# Patient Record
Sex: MALE | Race: WHITE | Employment: FULL TIME | URBAN - METROPOLITAN AREA
[De-identification: names, ages, dates, MRNs, and addresses within clinical notes are randomized per-mention and may not be internally consistent; named-entity substitution may affect disease eponyms.]

---

## 2018-11-13 PROBLEM — R29.898 WEAKNESS OF BOTH LOWER EXTREMITIES: Status: ACTIVE | Noted: 2018-11-13

## 2018-11-13 PROBLEM — E66.01 OBESITY, MORBID (HCC): Status: ACTIVE | Noted: 2018-11-13

## 2018-12-04 ENCOUNTER — ANESTHESIA EVENT (OUTPATIENT)
Dept: SURGERY | Age: 48
End: 2018-12-04

## 2018-12-04 RX ORDER — HYDROMORPHONE HYDROCHLORIDE 2 MG/ML
0.5 INJECTION, SOLUTION INTRAMUSCULAR; INTRAVENOUS; SUBCUTANEOUS
Status: CANCELLED | OUTPATIENT
Start: 2018-12-04

## 2018-12-04 RX ORDER — HYDROCODONE BITARTRATE AND ACETAMINOPHEN 5; 325 MG/1; MG/1
2 TABLET ORAL AS NEEDED
Status: CANCELLED | OUTPATIENT
Start: 2018-12-04

## 2018-12-04 RX ORDER — SODIUM CHLORIDE, SODIUM LACTATE, POTASSIUM CHLORIDE, CALCIUM CHLORIDE 600; 310; 30; 20 MG/100ML; MG/100ML; MG/100ML; MG/100ML
75 INJECTION, SOLUTION INTRAVENOUS CONTINUOUS
Status: CANCELLED | OUTPATIENT
Start: 2018-12-04

## 2018-12-04 RX ORDER — OXYCODONE HYDROCHLORIDE 5 MG/1
5 TABLET ORAL
Status: CANCELLED | OUTPATIENT
Start: 2018-12-04 | End: 2018-12-05

## 2018-12-05 ENCOUNTER — HOSPITAL ENCOUNTER (OUTPATIENT)
Dept: MRI IMAGING | Age: 48
Discharge: HOME | End: 2018-12-05
Attending: NEUROLOGICAL SURGERY

## 2018-12-05 ENCOUNTER — HOSPITAL ENCOUNTER (OUTPATIENT)
Age: 48
Setting detail: OUTPATIENT SURGERY
Discharge: HOME | End: 2018-12-05
Attending: RADIOLOGY | Admitting: RADIOLOGY

## 2018-12-05 ENCOUNTER — ANESTHESIA (OUTPATIENT)
Dept: SURGERY | Age: 48
End: 2018-12-05

## 2018-12-05 VITALS
HEART RATE: 110 BPM | OXYGEN SATURATION: 97 % | TEMPERATURE: 98.7 F | WEIGHT: 315 LBS | DIASTOLIC BLOOD PRESSURE: 111 MMHG | BODY MASS INDEX: 44.1 KG/M2 | SYSTOLIC BLOOD PRESSURE: 197 MMHG | RESPIRATION RATE: 20 BRPM | HEIGHT: 71 IN

## 2018-12-05 DIAGNOSIS — R29.898 LEG WEAKNESS: ICD-10-CM

## 2018-12-05 RX ORDER — LIDOCAINE HYDROCHLORIDE 10 MG/ML
0.1 INJECTION INFILTRATION; PERINEURAL AS NEEDED
Status: DISCONTINUED | OUTPATIENT
Start: 2018-12-05 | End: 2018-12-07 | Stop reason: HOSPADM

## 2018-12-05 RX ORDER — SODIUM CHLORIDE, SODIUM LACTATE, POTASSIUM CHLORIDE, CALCIUM CHLORIDE 600; 310; 30; 20 MG/100ML; MG/100ML; MG/100ML; MG/100ML
75 INJECTION, SOLUTION INTRAVENOUS CONTINUOUS
Status: ACTIVE | OUTPATIENT
Start: 2018-12-05 | End: 2018-12-06

## 2018-12-05 RX ORDER — MIDAZOLAM HYDROCHLORIDE 1 MG/ML
2 INJECTION, SOLUTION INTRAMUSCULAR; INTRAVENOUS
Status: ACTIVE | OUTPATIENT
Start: 2018-12-05 | End: 2018-12-06

## 2018-12-05 RX ORDER — MIDAZOLAM HYDROCHLORIDE 1 MG/ML
5 INJECTION, SOLUTION INTRAMUSCULAR; INTRAVENOUS ONCE
Status: ACTIVE | OUTPATIENT
Start: 2018-12-05 | End: 2018-12-06

## 2018-12-05 RX ORDER — FENTANYL CITRATE 50 UG/ML
100 INJECTION, SOLUTION INTRAMUSCULAR; INTRAVENOUS ONCE
Status: ACTIVE | OUTPATIENT
Start: 2018-12-05 | End: 2018-12-06

## 2018-12-05 NOTE — ANESTHESIA PREPROCEDURE EVALUATION
Anesthetic History No history of anesthetic complications Review of Systems / Medical History Patient summary reviewed and pertinent labs reviewed Pulmonary Within defined limits Neuro/Psych Within defined limits Cardiovascular Hypertension (no meds): poorly controlled Exercise tolerance: >4 METS 
  
GI/Hepatic/Renal 
Within defined limits Endo/Other Hypothyroidism: well controlled Morbid obesity Other Findings Physical Exam 
 
Airway Mallampati: III 
TM Distance: 4 - 6 cm Neck ROM: normal range of motion Mouth opening: Normal 
 
 Cardiovascular Regular rate and rhythm,  S1 and S2 normal,  no murmur, click, rub, or gallop Dental 
 
Dentition: Upper partial plate and Lower partial plate Pulmonary Breath sounds clear to auscultation Abdominal 
GI exam deferred Other Findings Anesthetic Plan ASA: 3 Anesthesia type: general 
 
 
 
 
Induction: Intravenous Anesthetic plan and risks discussed with: Patient

## 2018-12-05 NOTE — H&P
Patient: Cali Hand MRN: [id number]  SSN: [id number]    YOB: 1970  Age: 46 y.o. Sex: male      History and Physical    Yohannes Marcus is a 46 y.o. male having Procedure(s):  MRI ANESTHESIA- THORACIC SPINE WITHOUT CONTRAST. Allergies: No Known Allergies      Chief Complaint: Leg weakness         History of Present Illness: Leg weakness       Past Medical History:   Diagnosis Date    Arthritis     osteo    Claustrophobia     Hypertension     off meds x 3 yrs--pt stopped on his own--per pt-- b/p stable off meds    Leg weakness, bilateral onset 2/2017    Morbid obesity ([address]     bmi  50    Unspecified hypothyroidism       Past Surgical History:   Procedure Laterality Date    HX KNEE ARTHROSCOPY      bilateral    HX LUMBAR DISKECTOMY  2001    L-5    HX ORTHOPAEDIC      bone spur removal left ankle      Family History   Problem Relation Age of Onset    Thyroid Disease Mother     Hypertension Mother     Diabetes Maternal Grandfather     Cancer Maternal Grandfather         lung    Heart Disease Maternal Grandfather       Social History     Tobacco Use    Smoking status: Never Smoker    Smokeless tobacco: Former User   Substance Use Topics    Alcohol use: Yes     Comment: occ        Prior to Admission medications    Medication Sig Start Date End Date Taking? Authorizing Provider   Omega-3 Fatty Acids (FISH OIL) 500 mg cap Take 1 Tab by mouth daily. Yes Provider, Historical   levothyroxine (SYNTHROID) 112 mcg tablet Take 1 Tab by mouth Daily (before breakfast). 9/19/18  Yes Grecia Barreto MD        Visit Vitals  BP (!) 197/111 (BP Patient Position: At rest)   Pulse (!) 110   Temp 37.1 °C (98.7 °F)   Resp 20   Ht 5' 11\" (1.803 m)   Wt (!) 164.9 kg (363 lb 8 oz)   SpO2 97%   BMI 50.70 kg/m²        Assessment and Plan:   Cali Hand is a 46 y.o. male having Procedure(s):   MRI ANESTHESIA- THORACIC SPINE WITHOUT CONTRAST for Leg weakness   .     Preanesthesia Evaluation Last edited 12/05/18 1306 by Ofe Arzola MD  Date of Service 12/05/18 1248             Anesthetic History   No history of anesthetic complications            Review of Systems / Medical History  Patient summary reviewed and pertinent labs reviewed    Pulmonary  Within defined limits                 Neuro/Psych   Within defined limits           Cardiovascular    Hypertension (no meds): poorly controlled              Exercise tolerance: >4 METS     GI/Hepatic/Renal  Within defined limits              Endo/Other      Hypothyroidism: well controlled  Morbid obesity     Other Findings            Physical Exam    Airway  Mallampati: III  TM Distance: 4 - 6 cm  Neck ROM: normal range of motion   Mouth opening: Normal     Cardiovascular  Regular rate and rhythm,  S1 and S2 normal,  no murmur, click, rub, or gallop             Dental    Dentition: Upper partial plate and Lower partial plate     Pulmonary  Breath sounds clear to auscultation               Abdominal  GI exam deferred       Other Findings            Anesthetic Plan    ASA: 3  Anesthesia type: general          Induction: Intravenous  Anesthetic plan and risks discussed with: Patient               Preanesthesia evaluation performed by Ofe Arzola MD    Revision History       Date/Time User Provider Type Action    > 12/05/18 1306 Marck Lee MD Anesthesiologist Addend     12/05/18 1249 Marck Lee MD Anesthesiologist Addend     12/05/18 1248 Marck Lee MD Anesthesiologist Sign                  Signed By: Karthikeyan Asif MD     December 5, 2018

## 2018-12-05 NOTE — PROGRESS NOTES
Due to patient body habitus we had to check to see if patient would fit into machine for exam. It does not appear with his shoulder width that he will fit into the wide bore for this exam. Since he was to be sedated we wanted to check first but the patient would not allow us to try without the sedation but anesthesia did not feel comfortable sedating him before seeing if he would forget. He refused versed prior to the attempt also . Patient wants to go to an open air MRI.

## 2022-03-18 PROBLEM — E66.01 OBESITY, MORBID (HCC): Status: ACTIVE | Noted: 2018-11-13

## 2022-03-19 PROBLEM — R29.898 WEAKNESS OF BOTH LOWER EXTREMITIES: Status: ACTIVE | Noted: 2018-11-13

## 2023-08-01 ENCOUNTER — TELEPHONE (OUTPATIENT)
Dept: ORTHOPEDIC SURGERY | Age: 53
End: 2023-08-01

## 2023-08-04 ENCOUNTER — TELEPHONE (OUTPATIENT)
Dept: FAMILY MEDICINE CLINIC | Facility: CLINIC | Age: 53
End: 2023-08-04

## 2023-08-04 DIAGNOSIS — R73.09 OTHER ABNORMAL GLUCOSE: ICD-10-CM

## 2023-08-04 DIAGNOSIS — I10 ESSENTIAL (PRIMARY) HYPERTENSION: Primary | ICD-10-CM

## 2023-08-04 DIAGNOSIS — R53.83 FATIGUE, UNSPECIFIED TYPE: ICD-10-CM

## 2023-08-04 DIAGNOSIS — Z12.5 PROSTATE CANCER SCREENING: ICD-10-CM

## 2023-08-04 NOTE — TELEPHONE ENCOUNTER
----- Message from [address] [place] [address] sent at 8/4/2023 11:05 AM EDT -----  Subject: Referral Request    Reason for referral request? A1C, insulin, lipid panel, testosterone   Provider patient wants to be referred to(if known):     Provider Phone Number(if known): Additional Information for Provider? Pt wants these down before his appt   in October. Please call pt to make appt when these are scheduled.  Please   call pt about where blood gets sent for billing purposes  ---------------------------------------------------------------------------  --------------  Lj Ashland Christine    [phone]; OK to leave message on voicemail  ---------------------------------------------------------------------------  --------------

## 2023-08-07 NOTE — TELEPHONE ENCOUNTER
Pt to call back next month to schedule and informed pt we send labs to the hospital now and not lab [facility]

## 2023-08-28 ENCOUNTER — OFFICE VISIT (OUTPATIENT)
Dept: ORTHOPEDIC SURGERY | Age: 53
End: 2023-08-28
Payer: COMMERCIAL

## 2023-08-28 VITALS — BODY MASS INDEX: 43.67 KG/M2 | HEIGHT: 70 IN | WEIGHT: 305 LBS

## 2023-08-28 DIAGNOSIS — E66.01 MORBID OBESITY (HCC): ICD-10-CM

## 2023-08-28 DIAGNOSIS — G89.29 CHRONIC BILATERAL LOW BACK PAIN, UNSPECIFIED WHETHER SCIATICA PRESENT: ICD-10-CM

## 2023-08-28 DIAGNOSIS — M54.50 CHRONIC BILATERAL LOW BACK PAIN, UNSPECIFIED WHETHER SCIATICA PRESENT: ICD-10-CM

## 2023-08-28 DIAGNOSIS — M17.12 PRIMARY OSTEOARTHRITIS OF LEFT KNEE: ICD-10-CM

## 2023-08-28 DIAGNOSIS — M25.562 ACUTE PAIN OF LEFT KNEE: Primary | ICD-10-CM

## 2023-08-28 DIAGNOSIS — M17.11 PRIMARY OSTEOARTHRITIS OF RIGHT KNEE: ICD-10-CM

## 2023-08-28 PROCEDURE — G8428 CUR MEDS NOT DOCUMENT: HCPCS | Performed by: ORTHOPAEDIC SURGERY

## 2023-08-28 PROCEDURE — 99205 OFFICE O/P NEW HI 60 MIN: CPT | Performed by: ORTHOPAEDIC SURGERY

## 2023-08-28 PROCEDURE — 3017F COLORECTAL CA SCREEN DOC REV: CPT | Performed by: ORTHOPAEDIC SURGERY

## 2023-08-28 PROCEDURE — 4004F PT TOBACCO SCREEN RCVD TLK: CPT | Performed by: ORTHOPAEDIC SURGERY

## 2023-08-28 PROCEDURE — G8417 CALC BMI ABV UP PARAM F/U: HCPCS | Performed by: ORTHOPAEDIC SURGERY

## 2023-08-28 NOTE — PROGRESS NOTES
Name: Yair Perez  YOB: 1970  Gender: male  MRN: [id number]    CC: Left knee pain    HPI: Yair Perez is a 52 y.o. male who presents with longstanding progressive left knee pain and weakness. He states he had left knee arthroscopy in the mid 90s and found that his left knee has become more more unstable and insecure to him. He has had significant other issues including with his lower back and had 2 lumbar spine surgeries. Most recent 1 was in 2021 for what sounds like spinal stenosis. He had weakness in the lower extremities that was attributable to that. He feels that surgery is going well but he continues to have pain and instability with his left knee and a sense of insecurity on that side. This is much more so than on the right. He also has a history of morbid obesity with a BMI at 1 point over 50. He has lost a significant amount of weight although he still has a BMI of approaching 44. 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 ROS/Meds/PSH/PMH/FH/SH: I personally reviewed the patients standard intake form. Below are the pertinents    Tobacco:  reports that he has never smoked. He quit smokeless tobacco use about 28 years ago. Past Medical History:   Diagnosis Date    Arthritis     osteo    Claustrophobia     Hypertension     off meds x 3 yrs--pt stopped on his own--per pt-- b/p stable off meds    Leg weakness, bilateral onset 2/2017    Morbid obesity ([address])     bmi  50    Unspecified hypothyroidism       Past Surgical History:   Procedure Laterality Date    KNEE ARTHROSCOPY      bilateral    LUMBAR DISCECTOMY  2001    L-5    ORTHOPEDIC SURGERY      bone spur removal left ankle        Physical Examination:  Physical exam: On examination of the patient's gait there is antalgia in stance on the left side.   and there is valgus deformity in the leftknee    On examination while

## 2023-09-19 DIAGNOSIS — M17.12 PRIMARY OSTEOARTHRITIS OF LEFT KNEE: Primary | ICD-10-CM

## 2023-09-29 ENCOUNTER — NURSE ONLY (OUTPATIENT)
Dept: FAMILY MEDICINE CLINIC | Facility: CLINIC | Age: 53
End: 2023-09-29

## 2023-09-29 DIAGNOSIS — R53.83 FATIGUE, UNSPECIFIED TYPE: ICD-10-CM

## 2023-09-29 DIAGNOSIS — I10 ESSENTIAL (PRIMARY) HYPERTENSION: ICD-10-CM

## 2023-09-29 DIAGNOSIS — Z12.5 PROSTATE CANCER SCREENING: ICD-10-CM

## 2023-09-29 DIAGNOSIS — R73.09 OTHER ABNORMAL GLUCOSE: ICD-10-CM

## 2023-09-29 LAB
ALBUMIN SERPL-MCNC: 3.6 G/DL (ref 3.5–5)
ALBUMIN/GLOB SERPL: 1 (ref 0.4–1.6)
ALP SERPL-CCNC: 61 U/L (ref 50–136)
ALT SERPL-CCNC: 23 U/L (ref 12–65)
ANION GAP SERPL CALC-SCNC: 4 MMOL/L (ref 2–11)
AST SERPL-CCNC: 15 U/L (ref 15–37)
BILIRUB SERPL-MCNC: 0.6 MG/DL (ref 0.2–1.1)
BUN SERPL-MCNC: 13 MG/DL (ref 6–23)
CALCIUM SERPL-MCNC: 9 MG/DL (ref 8.3–10.4)
CHLORIDE SERPL-SCNC: 108 MMOL/L (ref 101–110)
CHOLEST SERPL-MCNC: 164 MG/DL
CO2 SERPL-SCNC: 30 MMOL/L (ref 21–32)
CREAT SERPL-MCNC: 1 MG/DL (ref 0.8–1.5)
EST. AVERAGE GLUCOSE BLD GHB EST-MCNC: 114 MG/DL
GLOBULIN SER CALC-MCNC: 3.5 G/DL (ref 2.8–4.5)
GLUCOSE SERPL-MCNC: 107 MG/DL (ref 65–100)
HBA1C MFR BLD: 5.6 % (ref 4.8–5.6)
HDLC SERPL-MCNC: 33 MG/DL (ref 40–60)
HDLC SERPL: 5
LDLC SERPL CALC-MCNC: 103.8 MG/DL
POTASSIUM SERPL-SCNC: 4.3 MMOL/L (ref 3.5–5.1)
PROT SERPL-MCNC: 7.1 G/DL (ref 6.3–8.2)
PSA SERPL-MCNC: 0.5 NG/ML
SODIUM SERPL-SCNC: 142 MMOL/L (ref 133–143)
TRIGL SERPL-MCNC: 136 MG/DL (ref 35–150)
VLDLC SERPL CALC-MCNC: 27.2 MG/DL (ref 6–23)

## 2023-09-30 LAB — INSULIN SERPL-ACNC: 21.4 UIU/ML (ref 2.6–24.9)

## 2023-10-06 ENCOUNTER — TELEPHONE (OUTPATIENT)
Dept: FAMILY MEDICINE CLINIC | Facility: CLINIC | Age: 53
End: 2023-10-06

## 2023-10-06 LAB — TESTOST FREE SERPL-MCNC: 5.7 PG/ML (ref 7.2–24)

## 2023-10-06 NOTE — TELEPHONE ENCOUNTER
See if it is too late to add the total testosterone to the labs drawn and if so I will get the total testosterone drawn when he comes in next week

## 2023-10-06 NOTE — TELEPHONE ENCOUNTER
Patient called stating he received the results on mychart for the [facility] Free lab patient states he thought he was having both Testo Free and Testo Total lab, spoke with Dr Delano Stone he's going check on this and see if he could add this if its not to late, patient have a OV on 10/10 @ 6756 with Dr Delano Stone

## 2023-10-07 SDOH — ECONOMIC STABILITY: TRANSPORTATION INSECURITY
IN THE PAST 12 MONTHS, HAS LACK OF TRANSPORTATION KEPT YOU FROM MEETINGS, WORK, OR FROM GETTING THINGS NEEDED FOR DAILY LIVING?: NO

## 2023-10-07 SDOH — ECONOMIC STABILITY: FOOD INSECURITY: WITHIN THE PAST 12 MONTHS, THE FOOD YOU BOUGHT JUST DIDN'T LAST AND YOU DIDN'T HAVE MONEY TO GET MORE.: NEVER TRUE

## 2023-10-07 SDOH — ECONOMIC STABILITY: INCOME INSECURITY: HOW HARD IS IT FOR YOU TO PAY FOR THE VERY BASICS LIKE FOOD, HOUSING, MEDICAL CARE, AND HEATING?: NOT HARD AT ALL

## 2023-10-07 SDOH — ECONOMIC STABILITY: FOOD INSECURITY: WITHIN THE PAST 12 MONTHS, YOU WORRIED THAT YOUR FOOD WOULD RUN OUT BEFORE YOU GOT MONEY TO BUY MORE.: NEVER TRUE

## 2023-10-07 SDOH — ECONOMIC STABILITY: HOUSING INSECURITY
IN THE LAST 12 MONTHS, WAS THERE A TIME WHEN YOU DID NOT HAVE A STEADY PLACE TO SLEEP OR SLEPT IN A SHELTER (INCLUDING NOW)?: NO

## 2023-10-07 ASSESSMENT — PATIENT HEALTH QUESTIONNAIRE - PHQ9
SUM OF ALL RESPONSES TO PHQ QUESTIONS 1-9: 0
SUM OF ALL RESPONSES TO PHQ QUESTIONS 1-9: 0
1. LITTLE INTEREST OR PLEASURE IN DOING THINGS: 0
1. LITTLE INTEREST OR PLEASURE IN DOING THINGS: NOT AT ALL
SUM OF ALL RESPONSES TO PHQ QUESTIONS 1-9: 0
2. FEELING DOWN, DEPRESSED OR HOPELESS: 0
SUM OF ALL RESPONSES TO PHQ QUESTIONS 1-9: 0
2. FEELING DOWN, DEPRESSED OR HOPELESS: NOT AT ALL
SUM OF ALL RESPONSES TO PHQ9 QUESTIONS 1 & 2: 0
SUM OF ALL RESPONSES TO PHQ9 QUESTIONS 1 & 2: 0

## 2023-10-10 ENCOUNTER — OFFICE VISIT (OUTPATIENT)
Dept: FAMILY MEDICINE CLINIC | Facility: CLINIC | Age: 53
End: 2023-10-10
Payer: COMMERCIAL

## 2023-10-10 VITALS
OXYGEN SATURATION: 97 % | DIASTOLIC BLOOD PRESSURE: 109 MMHG | TEMPERATURE: 98 F | SYSTOLIC BLOOD PRESSURE: 181 MMHG | BODY MASS INDEX: 44.24 KG/M2 | RESPIRATION RATE: 16 BRPM | WEIGHT: 309 LBS | HEIGHT: 70 IN | HEART RATE: 81 BPM

## 2023-10-10 DIAGNOSIS — R63.5 WEIGHT GAIN: ICD-10-CM

## 2023-10-10 DIAGNOSIS — R73.09 OTHER ABNORMAL GLUCOSE: ICD-10-CM

## 2023-10-10 DIAGNOSIS — R79.89 LOW TESTOSTERONE: ICD-10-CM

## 2023-10-10 DIAGNOSIS — I10 ESSENTIAL (PRIMARY) HYPERTENSION: Primary | ICD-10-CM

## 2023-10-10 DIAGNOSIS — D23.4 DERMOID CYST OF SCALP: ICD-10-CM

## 2023-10-10 PROCEDURE — 1036F TOBACCO NON-USER: CPT | Performed by: FAMILY MEDICINE

## 2023-10-10 PROCEDURE — 3080F DIAST BP >= 90 MM HG: CPT | Performed by: FAMILY MEDICINE

## 2023-10-10 PROCEDURE — G8417 CALC BMI ABV UP PARAM F/U: HCPCS | Performed by: FAMILY MEDICINE

## 2023-10-10 PROCEDURE — G8484 FLU IMMUNIZE NO ADMIN: HCPCS | Performed by: FAMILY MEDICINE

## 2023-10-10 PROCEDURE — G8427 DOCREV CUR MEDS BY ELIG CLIN: HCPCS | Performed by: FAMILY MEDICINE

## 2023-10-10 PROCEDURE — 3077F SYST BP >= 140 MM HG: CPT | Performed by: FAMILY MEDICINE

## 2023-10-10 PROCEDURE — 3017F COLORECTAL CA SCREEN DOC REV: CPT | Performed by: FAMILY MEDICINE

## 2023-10-10 PROCEDURE — 99214 OFFICE O/P EST MOD 30 MIN: CPT | Performed by: FAMILY MEDICINE

## 2023-10-10 RX ORDER — LISINOPRIL 40 MG/1
40 TABLET ORAL DAILY
Qty: 90 TABLET | Refills: 1 | Status: SHIPPED | OUTPATIENT
Start: 2023-10-10

## 2023-10-10 NOTE — PROGRESS NOTES
abnormal glucose  -     Hemoglobin A1C; Future  -     Insulin, Serum; Future    Low testosterone  -     Testosterone, Free; Future  -     Testosterone Total Only, Male; Future    Weight gain  -     TSH; Future    Dermoid cyst of scalp  -     [facility] - Jay Jay Hu MD, PA    We will refer to dermatology and consider repeat labs in 6 months and let him continue to watch diet and may repeat testosterone at that time which hopefully can improve if he gets through the knee surgery and is to exercise more. We will restart the lisinopril particularly the surgery coming up    Follow-up and Dispositions    Return in about 6 months (around 4/10/2024), or if symptoms worsen or fail to improve. Dictated using voice recognition software.  Proofread, but unrecognized errors may exist.

## 2023-10-31 ENCOUNTER — HOSPITAL ENCOUNTER (OUTPATIENT)
Dept: REHABILITATION | Age: 53
Discharge: HOME | End: 2023-11-03
Payer: COMMERCIAL

## 2023-10-31 ENCOUNTER — HOSPITAL ENCOUNTER (OUTPATIENT)
Dept: SURGERY | Age: 53
Discharge: HOME | End: 2023-11-03
Payer: COMMERCIAL

## 2023-10-31 VITALS
HEIGHT: 70 IN | BODY MASS INDEX: 45.07 KG/M2 | HEART RATE: 79 BPM | WEIGHT: 314.82 LBS | RESPIRATION RATE: 18 BRPM | DIASTOLIC BLOOD PRESSURE: 95 MMHG | OXYGEN SATURATION: 97 % | TEMPERATURE: 98.2 F | SYSTOLIC BLOOD PRESSURE: 166 MMHG

## 2023-10-31 DIAGNOSIS — M17.12 PRIMARY OSTEOARTHRITIS OF LEFT KNEE: ICD-10-CM

## 2023-10-31 DIAGNOSIS — R06.83 SNORING: Primary | ICD-10-CM

## 2023-10-31 LAB
ANION GAP SERPL CALC-SCNC: 7 MMOL/L (ref 2–11)
BASOPHILS # BLD: 0.1 K/UL (ref 0–0.2)
BASOPHILS NFR BLD: 1 % (ref 0–2)
BUN SERPL-MCNC: 19 MG/DL (ref 6–23)
CALCIUM SERPL-MCNC: 8.7 MG/DL (ref 8.3–10.4)
CHLORIDE SERPL-SCNC: 108 MMOL/L (ref 101–110)
CO2 SERPL-SCNC: 26 MMOL/L (ref 21–32)
CREAT SERPL-MCNC: 0.83 MG/DL (ref 0.8–1.5)
DIFFERENTIAL METHOD BLD: NORMAL
EKG ATRIAL RATE: 76 BPM
EKG DIAGNOSIS: NORMAL
EKG P AXIS: 48 DEGREES
EKG P-R INTERVAL: 156 MS
EKG Q-T INTERVAL: 384 MS
EKG QRS DURATION: 96 MS
EKG QTC CALCULATION (BAZETT): 432 MS
EKG R AXIS: 13 DEGREES
EKG T AXIS: 22 DEGREES
EKG VENTRICULAR RATE: 76 BPM
EOSINOPHIL # BLD: 0.3 K/UL (ref 0–0.8)
EOSINOPHIL NFR BLD: 4 % (ref 0.5–7.8)
ERYTHROCYTE [DISTWIDTH] IN BLOOD BY AUTOMATED COUNT: 12.5 % (ref 11.9–14.6)
EST. AVERAGE GLUCOSE BLD GHB EST-MCNC: 108 MG/DL
GLUCOSE SERPL-MCNC: 123 MG/DL (ref 65–100)
HBA1C MFR BLD: 5.4 % (ref 4.8–5.6)
HCT VFR BLD AUTO: 42.9 % (ref 41.1–50.3)
HGB BLD-MCNC: 14.2 G/DL (ref 13.6–17.2)
IMM GRANULOCYTES # BLD AUTO: 0 K/UL (ref 0–0.5)
IMM GRANULOCYTES NFR BLD AUTO: 0 % (ref 0–5)
INR PPP: 1
LYMPHOCYTES # BLD: 1.5 K/UL (ref 0.5–4.6)
LYMPHOCYTES NFR BLD: 24 % (ref 13–44)
MCH RBC QN AUTO: 27.8 PG (ref 26.1–32.9)
MCHC RBC AUTO-ENTMCNC: 33.1 G/DL (ref 31.4–35)
MCV RBC AUTO: 84.1 FL (ref 82–102)
MONOCYTES # BLD: 0.6 K/UL (ref 0.1–1.3)
MONOCYTES NFR BLD: 9 % (ref 4–12)
MRSA DNA SPEC QL NAA+PROBE: NOT DETECTED
NEUTS SEG # BLD: 3.8 K/UL (ref 1.7–8.2)
NEUTS SEG NFR BLD: 61 % (ref 43–78)
NRBC # BLD: 0 K/UL (ref 0–0.2)
PLATELET # BLD AUTO: 209 K/UL (ref 150–450)
PMV BLD AUTO: 11 FL (ref 9.4–12.3)
POTASSIUM SERPL-SCNC: 4 MMOL/L (ref 3.5–5.1)
PROTHROMBIN TIME: 13.4 SEC (ref 12.6–14.3)
RBC # BLD AUTO: 5.1 M/UL (ref 4.23–5.6)
S AUREUS CPE NOSE QL NAA+PROBE: DETECTED
SODIUM SERPL-SCNC: 141 MMOL/L (ref 133–143)
WBC # BLD AUTO: 6.3 K/UL (ref 4.3–11.1)

## 2023-10-31 PROCEDURE — 87641 MR-STAPH DNA AMP PROBE: CPT

## 2023-10-31 PROCEDURE — 94760 N-INVAS EAR/PLS OXIMETRY 1: CPT

## 2023-10-31 PROCEDURE — 80048 BASIC METABOLIC PNL TOTAL CA: CPT

## 2023-10-31 PROCEDURE — 93005 ELECTROCARDIOGRAM TRACING: CPT | Performed by: STUDENT IN AN ORGANIZED HEALTH CARE EDUCATION/TRAINING PROGRAM

## 2023-10-31 PROCEDURE — 97161 PT EVAL LOW COMPLEX 20 MIN: CPT

## 2023-10-31 PROCEDURE — 83036 HEMOGLOBIN GLYCOSYLATED A1C: CPT

## 2023-10-31 PROCEDURE — 93010 ELECTROCARDIOGRAM REPORT: CPT | Performed by: INTERNAL MEDICINE

## 2023-10-31 PROCEDURE — 85025 COMPLETE CBC W/AUTO DIFF WBC: CPT

## 2023-10-31 PROCEDURE — 85610 PROTHROMBIN TIME: CPT

## 2023-10-31 ASSESSMENT — PROMIS GLOBAL HEALTH SCALE
IN GENERAL, HOW WOULD YOU RATE YOUR SATISFACTION WITH YOUR SOCIAL ACTIVITIES AND RELATIONSHIPS [ON A SCALE OF 1 (POOR) TO 5 (EXCELLENT)]?: 4
IN GENERAL, HOW WOULD YOU RATE YOUR MENTAL HEALTH, INCLUDING YOUR MOOD AND YOUR ABILITY TO THINK [ON A SCALE OF 1 (POOR) TO 5 (EXCELLENT)]?: 5
WHO IS THE PERSON COMPLETING THE PROMIS V1.1 SURVEY?: 0
IN GENERAL, HOW WOULD YOU RATE YOUR PHYSICAL HEALTH [ON A SCALE OF 1 (POOR) TO 5 (EXCELLENT)]?: 3
IN GENERAL, WOULD YOU SAY YOUR QUALITY OF LIFE IS...[ON A SCALE OF 1 (POOR) TO 5 (EXCELLENT)]: 2
IN GENERAL, WOULD YOU SAY YOUR HEALTH IS...[ON A SCALE OF 1 (POOR) TO 5 (EXCELLENT)]: 3
IN THE PAST 7 DAYS, HOW OFTEN HAVE YOU BEEN BOTHERED BY EMOTIONAL PROBLEMS, SUCH AS FEELING ANXIOUS, DEPRESSED, OR IRRITABLE [ON A SCALE FROM 1 (NEVER) TO 5 (ALWAYS)]?: 5
SUM OF RESPONSES TO QUESTIONS 3, 6, 7, & 8: 11
TO WHAT EXTENT ARE YOU ABLE TO CARRY OUT YOUR EVERYDAY PHYSICAL ACTIVITIES SUCH AS WALKING, CLIMBING STAIRS, CARRYING GROCERIES, OR MOVING A CHAIR [ON A SCALE OF 1 (NOT AT ALL) TO 5 (COMPLETELY)]?: 2
HOW IS THE PROMIS V1.1 BEING ADMINISTERED?: 0
IN THE PAST 7 DAYS, HOW WOULD YOU RATE YOUR FATIGUE ON AVERAGE [ON A SCALE FROM 1 (NONE) TO 5 (VERY SEVERE)]?: 4
IN THE PAST 7 DAYS, HOW WOULD YOU RATE YOUR PAIN ON AVERAGE [ON A SCALE FROM 0 (NO PAIN) TO 10 (WORST IMAGINABLE PAIN)]?: 2
IN GENERAL, PLEASE RATE HOW WELL YOU CARRY OUT YOUR USUAL SOCIAL ACTIVITIES (INCLUDES ACTIVITIES AT HOME, AT WORK, AND IN YOUR COMMUNITY, AND RESPONSIBILITIES AS A PARENT, CHILD, SPOUSE, EMPLOYEE, FRIEND, ETC) [ON A SCALE OF 1 (POOR) TO 5 (EXCELLENT)]?: 5
SUM OF RESPONSES TO QUESTIONS 2, 4, 5, & 10: 16

## 2023-10-31 ASSESSMENT — PAIN DESCRIPTION - DESCRIPTORS
DESCRIPTORS: STABBING;DULL
DESCRIPTORS: DULL;SHARP

## 2023-10-31 ASSESSMENT — PAIN DESCRIPTION - ORIENTATION
ORIENTATION: LEFT;ANTERIOR;INNER;OUTER;POSTERIOR
ORIENTATION: LEFT;OUTER;INNER

## 2023-10-31 ASSESSMENT — KOOS JR
GOING UP OR DOWN STAIRS: 3
STRAIGHTENING KNEE FULLY: 1
HOW SEVERE IS YOUR KNEE STIFFNESS AFTER FIRST WAKING IN MORNING: 1
TWISING OR PIVOTING ON KNEE: 2
BENDING TO THE FLOOR TO PICK UP OBJECT: 3
KOOS JR TOTAL INTERVAL SCORE: 59.381
STANDING UPRIGHT: 0
RISING FROM SITTING: 1

## 2023-10-31 ASSESSMENT — PAIN DESCRIPTION - FREQUENCY: FREQUENCY: INTERMITTENT

## 2023-10-31 ASSESSMENT — PAIN DESCRIPTION - PAIN TYPE: TYPE: CHRONIC PAIN

## 2023-10-31 ASSESSMENT — PAIN - FUNCTIONAL ASSESSMENT: PAIN_FUNCTIONAL_ASSESSMENT: PREVENTS OR INTERFERES SOME ACTIVE ACTIVITIES AND ADLS

## 2023-10-31 ASSESSMENT — PAIN DESCRIPTION - ONSET: ONSET: ON-GOING

## 2023-10-31 ASSESSMENT — PULMONARY FUNCTION TESTS
FEV1 (%PREDICTED): 95
FEV1 (LITERS): 3.35

## 2023-10-31 ASSESSMENT — PAIN DESCRIPTION - LOCATION
LOCATION: KNEE
LOCATION: KNEE

## 2023-10-31 ASSESSMENT — PAIN SCALES - GENERAL
PAINLEVEL_OUTOF10: 3
PAINLEVEL_OUTOF10: 2

## 2023-10-31 NOTE — PROGRESS NOTES
Jaciel Zavala  : 1970  Primary: [facility] [place] [facility]  Secondary:  [facility] Camp at [facility]  [address] [facility], [facility] [place]  [phone]      Physical Therapy Prehab Evaluation Summary:10/31/2023   Time In/Out   PT Charge Capture  Episode     MEDICAL/REFERRING DIAGNOSIS: Unilateral primary osteoarthritis, left knee [M17.12]  REFERRING PHYSICIAN: Eusebia Mina MD    Treatment Diagnosis:   Pain in Left Knee (M25.562)  Stiffness of Left Knee, Not elsewhere classified (E31.602)    DATE OF SURGERY: 23  Assessment:   COMMENTS:  Mr. Naresh Serna is present for a Prehab Physical Therapy Assessment for their upcoming left TKA. They are here alone. After discussing the surgical admission options and discharge plans, they are planning on discharging on day of surgery. When he returns home, his wife will offer assist. He has a standard walker to use at [place]. He had T2 laminectomy and feels he's had LE weakness ever since. He has been completing his LE exs. PROBLEM LIST:   (Impacting functional limitations):  Mr. Naresh Serna presents with the following lower extremity(s) problems:  Strength  Range of Motion  Home Exercise Program  Pain INTERVENTIONS PLANNED:   (Benefits and precautions of physical therapy have been discussed with the patient.)  Home Exercise Program  Educational Discussion       GOALS: (Goals have been discussed and agreed upon with patient.)  Discharge Goals: Time Frame: 1 Day  Patient will demonstrate independence with a home exercise program designed to increase strength, range of motion, and pain control to minimize functional deficits and optimize patient for total joint replacement. Subjective:   Past Medical History/Comorbidities:   Mr. Naresh Serna  has a past medical history of Arthritis, Claustrophobia, Hypertension, Leg weakness, bilateral, Morbid obesity ([address]), and Unspecified hypothyroidism.   Mr. Naresh Serna  has a past surgical history that includes

## 2023-10-31 NOTE — PERIOP NOTE
Lab results within anesthesia guidelines, no follow-up required. Labs automatically routed to ordering provider via Epic documentation.     Latest Reference Range & Units 10/31/23 07:30   Sodium 133 - 143 mmol/L 141   Potassium 3.5 - 5.1 mmol/L 4.0   Chloride 101 - 110 mmol/L 108   CO2 21 - 32 mmol/L 26   BUN,BUNPL 6 - 23 MG/DL 19   Creatinine 0.8 - 1.5 MG/DL 0.83   Anion Gap 2 - 11 mmol/L 7   Est, Glom Filt Rate >60 ml/min/1.73m2 >60   Glucose, Random 65 - 100 mg/dL 123 (H)   CALCIUM, SERUM, 791515 8.3 - 10.4 MG/DL 8.7   Hemoglobin A1C 4.8 - 5.6 % 5.4   eAG (mg/dL) mg/dL 108   WBC 4.3 - 11.1 K/uL 6.3   RBC 4.23 - 5.6 M/uL 5.10   Hemoglobin Quant 13.6 - 17.2 g/dL 14.2   Hematocrit 41.1 - 50.3 % 42.9   MCV 82.0 - 102.0 FL 84.1   MCH 26.1 - 32.9 PG 27.8   MCHC 31.4 - 35.0 g/dL 33.1   MPV 9.4 - 12.3 FL 11.0   RDW 11.9 - 14.6 % 12.5   Platelet Count 851 - 450 K/uL 209   Neutrophils % 43 - 78 % 61   Lymphocyte % 13 - 44 % 24   Monocytes % 4.0 - 12.0 % 9   Eosinophils % 0.5 - 7.8 % 4   Basophils % 0.0 - 2.0 % 1   Neutrophils Absolute 1.7 - 8.2 K/UL 3.8   Lymphocytes Absolute 0.5 - 4.6 K/UL 1.5   Monocytes Absolute 0.1 - 1.3 K/UL 0.6   Eosinophils Absolute 0.0 - 0.8 K/UL 0.3   Basophils Absolute 0.0 - 0.2 K/UL 0.1   Differential Type -   AUTOMATED   Immature Granulocytes 0.0 - 5.0 % 0   Nucleated Red Blood Cells 0.0 - 0.2 K/uL 0.00   Absolute Immature Granulocyte 0.0 - 0.5 K/UL 0.0   Prothrombin Time 12.6 - 14.3 sec 13.4   INR -   1.0   MRSA/STAPH AUREUS DNA  Rpt   (H): Data is abnormally high  Rpt: View report in Results Review for more information
PLEASE CONTINUE TAKING ALL PRESCRIPTION MEDICATIONS UP TO THE DAY OF SURGERY UNLESS OTHERWISE DIRECTED BELOW. DISCONTINUE all vitamins, herbals and supplements 3 weeks prior to surgery. DISCONTINUE Non-Steroidal Anti-Inflammatory (NSAIDS) such as Advil, Ibuprofen, Motrin, Naproxen and Aleve 5 days prior to surgery. Home Medications to take  the day of surgery    None           Home Medications   to Hold   None        Comments   On the day before surgery please take Tylenol 1000mg in the morning and then again before bed. You may substitute for Tylenol 650 mg.      Bring Dynahex wash and Incentive Spirometer with you to hospital on the day of surgery. Please do not bring home medications with you on the day of surgery unless otherwise directed by your nurse. If you are instructed to bring home medications, please give them to your nurse as they will be administered by the nursing staff. If you have any questions, please call [address] [place] [address] [phone]. A copy of this note was provided to the patient for reference.
Patient verified name and . Order for consent was found in EHR and matches case posting; patient verified. robot assisted left total knee arthroplasty     Type 3 surgery, PAT joint camp assessment complete. Labs per surgeon: CBC,BMP, PT/INR, HgbA1c ; results pending  Labs per anesthesia protocol: no additional  EKG:Done today and within anesthesia guidelines. Pt states has not been taking Lisinopril as prescribed. Encouraged pt to start taking Lisinopril daily per PCP instructions. BP today 166/95. Stated he would start taking medication. MRSA/MSSA swab collected; pharmacy to review and dose antibiotic as appropriate. Hospital approved surgical skin cleanser and instructions to return bottle on DOS given per hospital policy. Patient provided with handouts including Guide to Surgery, Pain Management, Hand Hygiene, Blood Transfusion Education, and [facility] Brochure. Patient answered medical/surgical history questions at their best of ability. All prior to admission medications documented in [facility]. Original medication prescription bottle not visualized during patient appointment. Patient instructed to hold all vitamins 3 weeks prior to surgery and NSAIDS 5 days prior to surgery. Patient teach back successful and patient demonstrates knowledge of instruction.
spirometer when you aren't using it  Pt instructed to use IS 7 days prior to surgery 10 breaths BID and to bring IS to hospital DOS.  Pt verbalized understanding

## 2023-11-14 ENCOUNTER — TELEPHONE (OUTPATIENT)
Dept: ORTHOPEDIC SURGERY | Age: 53
End: 2023-11-14

## 2023-11-17 ENCOUNTER — OFFICE VISIT (OUTPATIENT)
Dept: ORTHOPEDIC SURGERY | Age: 53
End: 2023-11-17

## 2023-11-17 DIAGNOSIS — M17.12 PRIMARY OSTEOARTHRITIS OF LEFT KNEE: Primary | ICD-10-CM

## 2023-11-17 RX ORDER — METHOCARBAMOL 750 MG/1
TABLET, FILM COATED ORAL
Qty: 40 TABLET | Refills: 0 | Status: SHIPPED | OUTPATIENT
Start: 2023-11-17

## 2023-11-17 RX ORDER — ASPIRIN 81 MG/1
81 TABLET ORAL 2 TIMES DAILY
Qty: 60 TABLET | Refills: 0 | Status: SHIPPED | OUTPATIENT
Start: 2023-11-17

## 2023-11-17 RX ORDER — OXYCODONE HYDROCHLORIDE 5 MG/1
5-10 TABLET ORAL EVERY 4 HOURS PRN
Qty: 60 TABLET | Refills: 0 | Status: SHIPPED | OUTPATIENT
Start: 2023-11-17 | End: 2023-11-24

## 2023-11-17 RX ORDER — CELECOXIB 200 MG/1
200 CAPSULE ORAL DAILY
Qty: 30 CAPSULE | Refills: 0 | Status: SHIPPED | OUTPATIENT
Start: 2023-11-17

## 2023-11-17 RX ORDER — ONDANSETRON 4 MG/1
4 TABLET, FILM COATED ORAL EVERY 6 HOURS PRN
Qty: 30 TABLET | Refills: 0 | Status: SHIPPED | OUTPATIENT
Start: 2023-11-17

## 2023-11-17 NOTE — PROGRESS NOTES
Name: Maurice Mcintyre  YOB: 1970  Gender: male  MRN: [id number]    Pre-Op     CC: LEFT KNEE PAIN       This patient comes in for pre-op exam prior to LEFT TKA. The patient has been cleared preoperatively. I counseled the patient once again about the risks of infection, DVT formation, expected time of hospitalization, anticipated recovery time as well as rehab needs and expectations for recovery. The patient would like to proceed and we will do so as planned. The patient was provided with pain medications as well as DVT prophylaxis to have on hand postoperatively at the time of discharge from hospital. All pertinent questions asked by the patient were answered.     MARGARITA Olvera

## 2023-11-17 NOTE — H&P
plantar flexion and dorsiflexion. GAIT: abnormal MUSCULOSKELETAL: ROM: full with pain. Tenderness: over the medial and lateral joint lines. Crepitus: present. SKIN: No rash, bruising, swelling, redness or warmth. Labs:  No results found for this or any previous visit (from the past 24 hour(s)). Xray Left knee: joint space narrowing with advanced degenerative changes. Assessment:  Advanced Left Knee Osteoarthritis. Robot assisted Total Left Knee Arthroplasty Indicated. Patient Active Problem List   Diagnosis    Obesity, morbid ([address])    Weakness of both lower extremities    Osteoarthritis of left knee       Plan:  I have advised the patient of the risks and consequences, including possible complications of performing total joint replacement, as well as not doing this operation. The patient had the opportunity to ask questions and have them answered to their satisfaction.      Signed:  MARGARITA Callahan 11/17/2023

## 2023-11-20 ENCOUNTER — ANESTHESIA EVENT (OUTPATIENT)
Dept: SURGERY | Age: 53
End: 2023-11-20
Payer: COMMERCIAL

## 2023-11-21 ENCOUNTER — ANESTHESIA (OUTPATIENT)
Dept: SURGERY | Age: 53
End: 2023-11-21
Payer: COMMERCIAL

## 2023-11-21 ENCOUNTER — HOSPITAL ENCOUNTER (OUTPATIENT)
Age: 53
Discharge: HOME | End: 2023-11-21
Attending: ORTHOPAEDIC SURGERY | Admitting: ORTHOPAEDIC SURGERY
Payer: COMMERCIAL

## 2023-11-21 VITALS
RESPIRATION RATE: 15 BRPM | BODY MASS INDEX: 46.23 KG/M2 | WEIGHT: 315 LBS | TEMPERATURE: 97.9 F | OXYGEN SATURATION: 97 % | SYSTOLIC BLOOD PRESSURE: 116 MMHG | DIASTOLIC BLOOD PRESSURE: 67 MMHG | HEART RATE: 75 BPM

## 2023-11-21 PROBLEM — M17.12 ARTHRITIS OF LEFT KNEE: Status: ACTIVE | Noted: 2023-11-21

## 2023-11-21 PROCEDURE — 6370000000 HC RX 637 (ALT 250 FOR IP): Performed by: STUDENT IN AN ORGANIZED HEALTH CARE EDUCATION/TRAINING PROGRAM

## 2023-11-21 PROCEDURE — 97161 PT EVAL LOW COMPLEX 20 MIN: CPT

## 2023-11-21 PROCEDURE — 6370000000 HC RX 637 (ALT 250 FOR IP): Performed by: PHYSICIAN ASSISTANT

## 2023-11-21 PROCEDURE — 7100000001 HC PACU RECOVERY - ADDTL 15 MIN: Performed by: ORTHOPAEDIC SURGERY

## 2023-11-21 PROCEDURE — 3600000015 HC SURGERY LEVEL 5 ADDTL 15MIN: Performed by: ORTHOPAEDIC SURGERY

## 2023-11-21 PROCEDURE — 6360000002 HC RX W HCPCS: Performed by: ORTHOPAEDIC SURGERY

## 2023-11-21 PROCEDURE — 6360000002 HC RX W HCPCS: Performed by: NURSE ANESTHETIST, CERTIFIED REGISTERED

## 2023-11-21 PROCEDURE — 3700000000 HC ANESTHESIA ATTENDED CARE: Performed by: ORTHOPAEDIC SURGERY

## 2023-11-21 PROCEDURE — 2720000010 HC SURG SUPPLY STERILE: Performed by: ORTHOPAEDIC SURGERY

## 2023-11-21 PROCEDURE — 6360000002 HC RX W HCPCS: Performed by: PHYSICIAN ASSISTANT

## 2023-11-21 PROCEDURE — C1713 ANCHOR/SCREW BN/BN,TIS/BN: HCPCS | Performed by: ORTHOPAEDIC SURGERY

## 2023-11-21 PROCEDURE — 2709999900 HC NON-CHARGEABLE SUPPLY: Performed by: ORTHOPAEDIC SURGERY

## 2023-11-21 PROCEDURE — 97535 SELF CARE MNGMENT TRAINING: CPT

## 2023-11-21 PROCEDURE — 2580000003 HC RX 258: Performed by: STUDENT IN AN ORGANIZED HEALTH CARE EDUCATION/TRAINING PROGRAM

## 2023-11-21 PROCEDURE — C1776 JOINT DEVICE (IMPLANTABLE): HCPCS | Performed by: ORTHOPAEDIC SURGERY

## 2023-11-21 PROCEDURE — 2580000003 HC RX 258: Performed by: ORTHOPAEDIC SURGERY

## 2023-11-21 PROCEDURE — 6360000002 HC RX W HCPCS: Performed by: STUDENT IN AN ORGANIZED HEALTH CARE EDUCATION/TRAINING PROGRAM

## 2023-11-21 PROCEDURE — 94760 N-INVAS EAR/PLS OXIMETRY 1: CPT

## 2023-11-21 PROCEDURE — 7100000000 HC PACU RECOVERY - FIRST 15 MIN: Performed by: ORTHOPAEDIC SURGERY

## 2023-11-21 PROCEDURE — 2500000003 HC RX 250 WO HCPCS: Performed by: ORTHOPAEDIC SURGERY

## 2023-11-21 PROCEDURE — 3700000001 HC ADD 15 MINUTES (ANESTHESIA): Performed by: ORTHOPAEDIC SURGERY

## 2023-11-21 PROCEDURE — 64447 NJX AA&/STRD FEMORAL NRV IMG: CPT | Performed by: STUDENT IN AN ORGANIZED HEALTH CARE EDUCATION/TRAINING PROGRAM

## 2023-11-21 PROCEDURE — 3600000005 HC SURGERY LEVEL 5 BASE: Performed by: ORTHOPAEDIC SURGERY

## 2023-11-21 PROCEDURE — 97165 OT EVAL LOW COMPLEX 30 MIN: CPT

## 2023-11-21 PROCEDURE — 2500000003 HC RX 250 WO HCPCS: Performed by: NURSE ANESTHETIST, CERTIFIED REGISTERED

## 2023-11-21 PROCEDURE — 97530 THERAPEUTIC ACTIVITIES: CPT

## 2023-11-21 DEVICE — ATTUNE KNEE SYSTEM TIBIAL INSERT ROTATING PLATFORM POSTERIOR STABILIZED 7 8MM AOX
Type: IMPLANTABLE DEVICE | Site: TIBIA | Status: FUNCTIONAL
Brand: ATTUNE

## 2023-11-21 DEVICE — ATTUNE PATELLA MEDIALIZED ANATOMIC 41MM CEMENTED AOX
Type: IMPLANTABLE DEVICE | Site: PATELLA | Status: FUNCTIONAL
Brand: ATTUNE

## 2023-11-21 DEVICE — ATTUNE FEMORAL POROCOAT POSTERIOR STABILIZED SIZE 7 LEFT CEMENTLESS
Type: IMPLANTABLE DEVICE | Site: FEMUR | Status: FUNCTIONAL
Brand: ATTUNE

## 2023-11-21 DEVICE — ATTUNE KNEE SYSTEM TIBIAL BASE POROCOAT ROTATING PLATFORM SIZE 9 CEMENTLESS
Type: IMPLANTABLE DEVICE | Site: TIBIA | Status: FUNCTIONAL
Brand: ATTUNE

## 2023-11-21 DEVICE — KNEE K2 TOT HEMI ADV CMTLS IMPL CAPPED SYNTHES: Type: IMPLANTABLE DEVICE | Status: FUNCTIONAL

## 2023-11-21 DEVICE — DEPUY CMW 2 FAST SET BONE CEMENT 20G: Type: IMPLANTABLE DEVICE | Site: PATELLA | Status: FUNCTIONAL

## 2023-11-21 RX ORDER — SODIUM CHLORIDE 0.9 % (FLUSH) 0.9 %
5-40 SYRINGE (ML) INJECTION EVERY 12 HOURS SCHEDULED
Status: DISCONTINUED | OUTPATIENT
Start: 2023-11-21 | End: 2023-11-21 | Stop reason: HOSPADM

## 2023-11-21 RX ORDER — TRANEXAMIC ACID 100 MG/ML
INJECTION, SOLUTION INTRAVENOUS PRN
Status: DISCONTINUED | OUTPATIENT
Start: 2023-11-21 | End: 2023-11-21 | Stop reason: SDUPTHER

## 2023-11-21 RX ORDER — OXYCODONE HYDROCHLORIDE 5 MG/1
10 TABLET ORAL EVERY 4 HOURS PRN
Status: DISCONTINUED | OUTPATIENT
Start: 2023-11-21 | End: 2023-11-21 | Stop reason: HOSPADM

## 2023-11-21 RX ORDER — ROPIVACAINE HYDROCHLORIDE 2 MG/ML
INJECTION, SOLUTION EPIDURAL; INFILTRATION; PERINEURAL PRN
Status: DISCONTINUED | OUTPATIENT
Start: 2023-11-21 | End: 2023-11-21 | Stop reason: ALTCHOICE

## 2023-11-21 RX ORDER — ACETAMINOPHEN 500 MG
1000 TABLET ORAL ONCE
Status: COMPLETED | OUTPATIENT
Start: 2023-11-21 | End: 2023-11-21

## 2023-11-21 RX ORDER — METHOCARBAMOL 750 MG/1
750 TABLET, FILM COATED ORAL 4 TIMES DAILY PRN
Status: DISCONTINUED | OUTPATIENT
Start: 2023-11-21 | End: 2023-11-21 | Stop reason: HOSPADM

## 2023-11-21 RX ORDER — PROPOFOL 10 MG/ML
INJECTION, EMULSION INTRAVENOUS PRN
Status: DISCONTINUED | OUTPATIENT
Start: 2023-11-21 | End: 2023-11-21 | Stop reason: SDUPTHER

## 2023-11-21 RX ORDER — ACETAMINOPHEN 500 MG
1000 TABLET ORAL ONCE
Status: DISCONTINUED | OUTPATIENT
Start: 2023-11-21 | End: 2023-11-21 | Stop reason: SDUPTHER

## 2023-11-21 RX ORDER — SODIUM CHLORIDE 9 MG/ML
INJECTION, SOLUTION INTRAVENOUS PRN
Status: DISCONTINUED | OUTPATIENT
Start: 2023-11-21 | End: 2023-11-21 | Stop reason: HOSPADM

## 2023-11-21 RX ORDER — SENNA AND DOCUSATE SODIUM 50; 8.6 MG/1; MG/1
1 TABLET, FILM COATED ORAL 2 TIMES DAILY
Status: DISCONTINUED | OUTPATIENT
Start: 2023-11-21 | End: 2023-11-21 | Stop reason: HOSPADM

## 2023-11-21 RX ORDER — KETOROLAC TROMETHAMINE 30 MG/ML
INJECTION, SOLUTION INTRAMUSCULAR; INTRAVENOUS PRN
Status: DISCONTINUED | OUTPATIENT
Start: 2023-11-21 | End: 2023-11-21 | Stop reason: ALTCHOICE

## 2023-11-21 RX ORDER — CELECOXIB 200 MG/1
200 CAPSULE ORAL DAILY
Status: DISCONTINUED | OUTPATIENT
Start: 2023-11-22 | End: 2023-11-21 | Stop reason: HOSPADM

## 2023-11-21 RX ORDER — DEXAMETHASONE SODIUM PHOSPHATE 10 MG/ML
10 INJECTION INTRAMUSCULAR; INTRAVENOUS ONCE
Status: DISCONTINUED | OUTPATIENT
Start: 2023-11-22 | End: 2023-11-21 | Stop reason: HOSPADM

## 2023-11-21 RX ORDER — MIDAZOLAM HYDROCHLORIDE 2 MG/2ML
2 INJECTION, SOLUTION INTRAMUSCULAR; INTRAVENOUS
Status: COMPLETED | OUTPATIENT
Start: 2023-11-21 | End: 2023-11-21

## 2023-11-21 RX ORDER — LIDOCAINE HYDROCHLORIDE 10 MG/ML
1 INJECTION, SOLUTION INFILTRATION; PERINEURAL
Status: DISCONTINUED | OUTPATIENT
Start: 2023-11-21 | End: 2023-11-21 | Stop reason: HOSPADM

## 2023-11-21 RX ORDER — DIPHENHYDRAMINE HCL 25 MG
25 CAPSULE ORAL EVERY 6 HOURS PRN
Status: DISCONTINUED | OUTPATIENT
Start: 2023-11-21 | End: 2023-11-21 | Stop reason: HOSPADM

## 2023-11-21 RX ORDER — SODIUM CHLORIDE 0.9 % (FLUSH) 0.9 %
5-40 SYRINGE (ML) INJECTION PRN
Status: DISCONTINUED | OUTPATIENT
Start: 2023-11-21 | End: 2023-11-21 | Stop reason: HOSPADM

## 2023-11-21 RX ORDER — HYDROMORPHONE HYDROCHLORIDE 1 MG/ML
0.5 INJECTION, SOLUTION INTRAMUSCULAR; INTRAVENOUS; SUBCUTANEOUS EVERY 5 MIN PRN
Status: DISCONTINUED | OUTPATIENT
Start: 2023-11-21 | End: 2023-11-21 | Stop reason: HOSPADM

## 2023-11-21 RX ORDER — OXYCODONE HYDROCHLORIDE 5 MG/1
5 TABLET ORAL EVERY 4 HOURS PRN
Status: DISCONTINUED | OUTPATIENT
Start: 2023-11-21 | End: 2023-11-21 | Stop reason: HOSPADM

## 2023-11-21 RX ORDER — OXYCODONE HYDROCHLORIDE 5 MG/1
5 TABLET ORAL
Status: DISCONTINUED | OUTPATIENT
Start: 2023-11-21 | End: 2023-11-21 | Stop reason: HOSPADM

## 2023-11-21 RX ORDER — HYDROMORPHONE HYDROCHLORIDE 1 MG/ML
0.5 INJECTION, SOLUTION INTRAMUSCULAR; INTRAVENOUS; SUBCUTANEOUS
Status: DISCONTINUED | OUTPATIENT
Start: 2023-11-21 | End: 2023-11-21 | Stop reason: HOSPADM

## 2023-11-21 RX ORDER — PROCHLORPERAZINE EDISYLATE 5 MG/ML
5 INJECTION INTRAMUSCULAR; INTRAVENOUS
Status: DISCONTINUED | OUTPATIENT
Start: 2023-11-21 | End: 2023-11-21 | Stop reason: HOSPADM

## 2023-11-21 RX ORDER — FENTANYL CITRATE 50 UG/ML
100 INJECTION, SOLUTION INTRAMUSCULAR; INTRAVENOUS
Status: COMPLETED | OUTPATIENT
Start: 2023-11-21 | End: 2023-11-21

## 2023-11-21 RX ORDER — ACETAMINOPHEN 325 MG/1
650 TABLET ORAL EVERY 6 HOURS
Status: DISCONTINUED | OUTPATIENT
Start: 2023-11-21 | End: 2023-11-21 | Stop reason: HOSPADM

## 2023-11-21 RX ORDER — MAGNESIUM HYDROXIDE/ALUMINUM HYDROXICE/SIMETHICONE 120; 1200; 1200 MG/30ML; MG/30ML; MG/30ML
15 SUSPENSION ORAL EVERY 6 HOURS PRN
Status: DISCONTINUED | OUTPATIENT
Start: 2023-11-21 | End: 2023-11-21 | Stop reason: HOSPADM

## 2023-11-21 RX ORDER — MIDAZOLAM HYDROCHLORIDE 1 MG/ML
INJECTION INTRAMUSCULAR; INTRAVENOUS PRN
Status: DISCONTINUED | OUTPATIENT
Start: 2023-11-21 | End: 2023-11-21 | Stop reason: SDUPTHER

## 2023-11-21 RX ORDER — LISINOPRIL 20 MG/1
40 TABLET ORAL DAILY
Status: DISCONTINUED | OUTPATIENT
Start: 2023-11-22 | End: 2023-11-21 | Stop reason: HOSPADM

## 2023-11-21 RX ORDER — PROMETHAZINE HYDROCHLORIDE 25 MG/1
25 TABLET ORAL EVERY 6 HOURS PRN
Status: DISCONTINUED | OUTPATIENT
Start: 2023-11-21 | End: 2023-11-21 | Stop reason: HOSPADM

## 2023-11-21 RX ORDER — DEXAMETHASONE SODIUM PHOSPHATE 4 MG/ML
INJECTION, SOLUTION INTRA-ARTICULAR; INTRALESIONAL; INTRAMUSCULAR; INTRAVENOUS; SOFT TISSUE
Status: DISCONTINUED | OUTPATIENT
Start: 2023-11-21 | End: 2023-11-21 | Stop reason: SDUPTHER

## 2023-11-21 RX ORDER — ASPIRIN 81 MG/1
81 TABLET ORAL 2 TIMES DAILY
Status: DISCONTINUED | OUTPATIENT
Start: 2023-11-21 | End: 2023-11-21 | Stop reason: HOSPADM

## 2023-11-21 RX ORDER — SODIUM CHLORIDE, SODIUM LACTATE, POTASSIUM CHLORIDE, CALCIUM CHLORIDE 600; 310; 30; 20 MG/100ML; MG/100ML; MG/100ML; MG/100ML
INJECTION, SOLUTION INTRAVENOUS CONTINUOUS
Status: DISCONTINUED | OUTPATIENT
Start: 2023-11-21 | End: 2023-11-21 | Stop reason: HOSPADM

## 2023-11-21 RX ORDER — DIPHENHYDRAMINE HYDROCHLORIDE 50 MG/ML
25 INJECTION INTRAMUSCULAR; INTRAVENOUS EVERY 6 HOURS PRN
Status: DISCONTINUED | OUTPATIENT
Start: 2023-11-21 | End: 2023-11-21 | Stop reason: HOSPADM

## 2023-11-21 RX ORDER — NALOXONE HYDROCHLORIDE 0.4 MG/ML
0.4 INJECTION, SOLUTION INTRAMUSCULAR; INTRAVENOUS; SUBCUTANEOUS PRN
Status: DISCONTINUED | OUTPATIENT
Start: 2023-11-21 | End: 2023-11-21 | Stop reason: HOSPADM

## 2023-11-21 RX ORDER — SODIUM CHLORIDE 9 MG/ML
INJECTION, SOLUTION INTRAVENOUS CONTINUOUS
Status: DISCONTINUED | OUTPATIENT
Start: 2023-11-21 | End: 2023-11-21 | Stop reason: HOSPADM

## 2023-11-21 RX ORDER — ONDANSETRON 2 MG/ML
4 INJECTION INTRAMUSCULAR; INTRAVENOUS EVERY 6 HOURS PRN
Status: DISCONTINUED | OUTPATIENT
Start: 2023-11-21 | End: 2023-11-21 | Stop reason: HOSPADM

## 2023-11-21 RX ORDER — ALBUTEROL SULFATE 2.5 MG/3ML
2.5 SOLUTION RESPIRATORY (INHALATION) EVERY 6 HOURS PRN
Status: DISCONTINUED | OUTPATIENT
Start: 2023-11-21 | End: 2023-11-21 | Stop reason: HOSPADM

## 2023-11-21 RX ORDER — ONDANSETRON 2 MG/ML
4 INJECTION INTRAMUSCULAR; INTRAVENOUS
Status: DISCONTINUED | OUTPATIENT
Start: 2023-11-21 | End: 2023-11-21 | Stop reason: HOSPADM

## 2023-11-21 RX ORDER — HYDROMORPHONE HYDROCHLORIDE 1 MG/ML
1 INJECTION, SOLUTION INTRAMUSCULAR; INTRAVENOUS; SUBCUTANEOUS
Status: DISCONTINUED | OUTPATIENT
Start: 2023-11-21 | End: 2023-11-21 | Stop reason: HOSPADM

## 2023-11-21 RX ADMIN — DEXAMETHASONE SODIUM PHOSPHATE 4 MG: 4 INJECTION, SOLUTION INTRAMUSCULAR; INTRAVENOUS at 07:27

## 2023-11-21 RX ADMIN — METHOCARBAMOL 750 MG: 750 TABLET ORAL at 11:26

## 2023-11-21 RX ADMIN — SODIUM CHLORIDE, SODIUM LACTATE, POTASSIUM CHLORIDE, AND CALCIUM CHLORIDE: 600; 310; 30; 20 INJECTION, SOLUTION INTRAVENOUS at 09:31

## 2023-11-21 RX ADMIN — MIDAZOLAM 2 MG: 1 INJECTION INTRAMUSCULAR; INTRAVENOUS at 07:27

## 2023-11-21 RX ADMIN — PHENYLEPHRINE HYDROCHLORIDE 100 MCG: 0.1 INJECTION, SOLUTION INTRAVENOUS at 08:56

## 2023-11-21 RX ADMIN — PHENYLEPHRINE HYDROCHLORIDE 100 MCG: 0.1 INJECTION, SOLUTION INTRAVENOUS at 09:25

## 2023-11-21 RX ADMIN — ACETAMINOPHEN 1000 MG: 500 TABLET, FILM COATED ORAL at 06:39

## 2023-11-21 RX ADMIN — MEPIVACAINE HYDROCHLORIDE 60 MG: 20 INJECTION, SOLUTION EPIDURAL; INFILTRATION at 08:04

## 2023-11-21 RX ADMIN — PHENYLEPHRINE HYDROCHLORIDE 100 MCG: 0.1 INJECTION, SOLUTION INTRAVENOUS at 08:50

## 2023-11-21 RX ADMIN — OXYCODONE 10 MG: 5 TABLET ORAL at 11:26

## 2023-11-21 RX ADMIN — PHENYLEPHRINE HYDROCHLORIDE 100 MCG: 0.1 INJECTION, SOLUTION INTRAVENOUS at 09:02

## 2023-11-21 RX ADMIN — MIDAZOLAM 1 MG: 1 INJECTION INTRAMUSCULAR; INTRAVENOUS at 08:07

## 2023-11-21 RX ADMIN — TRANEXAMIC ACID 1000 MG: 100 INJECTION, SOLUTION INTRAVENOUS at 08:16

## 2023-11-21 RX ADMIN — SODIUM CHLORIDE, SODIUM LACTATE, POTASSIUM CHLORIDE, AND CALCIUM CHLORIDE: 600; 310; 30; 20 INJECTION, SOLUTION INTRAVENOUS at 07:57

## 2023-11-21 RX ADMIN — PHENYLEPHRINE HYDROCHLORIDE 100 MCG: 0.1 INJECTION, SOLUTION INTRAVENOUS at 08:41

## 2023-11-21 RX ADMIN — PHENYLEPHRINE HYDROCHLORIDE 50 MCG: 0.1 INJECTION, SOLUTION INTRAVENOUS at 08:33

## 2023-11-21 RX ADMIN — PROPOFOL 120 MCG/KG/MIN: 10 INJECTION, EMULSION INTRAVENOUS at 08:19

## 2023-11-21 RX ADMIN — SODIUM CHLORIDE, SODIUM LACTATE, POTASSIUM CHLORIDE, AND CALCIUM CHLORIDE: 600; 310; 30; 20 INJECTION, SOLUTION INTRAVENOUS at 08:43

## 2023-11-21 RX ADMIN — PHENYLEPHRINE HYDROCHLORIDE 100 MCG: 0.1 INJECTION, SOLUTION INTRAVENOUS at 09:36

## 2023-11-21 RX ADMIN — FENTANYL CITRATE 100 MCG: 50 INJECTION INTRAMUSCULAR; INTRAVENOUS at 07:27

## 2023-11-21 RX ADMIN — MIDAZOLAM 1 MG: 1 INJECTION INTRAMUSCULAR; INTRAVENOUS at 08:06

## 2023-11-21 RX ADMIN — Medication 3000 MG: at 08:18

## 2023-11-21 RX ADMIN — ROPIVACAINE HYDROCHLORIDE 20 ML: 2 INJECTION, SOLUTION EPIDURAL; INFILTRATION at 07:27

## 2023-11-21 RX ADMIN — PHENYLEPHRINE HYDROCHLORIDE 100 MCG: 0.1 INJECTION, SOLUTION INTRAVENOUS at 09:15

## 2023-11-21 RX ADMIN — PHENYLEPHRINE HYDROCHLORIDE 100 MCG: 0.1 INJECTION, SOLUTION INTRAVENOUS at 09:51

## 2023-11-21 RX ADMIN — PROPOFOL 70 MG: 10 INJECTION, EMULSION INTRAVENOUS at 08:18

## 2023-11-21 ASSESSMENT — PAIN DESCRIPTION - DESCRIPTORS: DESCRIPTORS: ACHING

## 2023-11-21 ASSESSMENT — KOOS JR
KOOS JR TOTAL INTERVAL SCORE: 52.465
GOING UP OR DOWN STAIRS: 2
STRAIGHTENING KNEE FULLY: 2
HOW SEVERE IS YOUR KNEE STIFFNESS AFTER FIRST WAKING IN MORNING: 2
RISING FROM SITTING: 2
BENDING TO THE FLOOR TO PICK UP OBJECT: 2
STANDING UPRIGHT: 2
TWISING OR PIVOTING ON KNEE: 2

## 2023-11-21 ASSESSMENT — PAIN SCALES - GENERAL
PAINLEVEL_OUTOF10: 3
PAINLEVEL_OUTOF10: 5

## 2023-11-21 ASSESSMENT — PAIN - FUNCTIONAL ASSESSMENT
PAIN_FUNCTIONAL_ASSESSMENT: 0-10
PAIN_FUNCTIONAL_ASSESSMENT: PREVENTS OR INTERFERES SOME ACTIVE ACTIVITIES AND ADLS

## 2023-11-21 NOTE — PERIOP NOTE
TRANSFER - OUT REPORT:    Verbal report given to Miugel Allen on Emory Hotter  being transferred to [address] [place] [address] for routine progression of patient care       Report consisted of patient's Situation, Background, Assessment and   Recommendations(SBAR). Information from the following report(s) Nurse Handoff Report was reviewed with the receiving nurse. Lines:   Peripheral IV 11/21/23 Posterior;Right Hand (Active)        Opportunity for questions and clarification was provided.       Patient transported with:

## 2023-11-21 NOTE — DISCHARGE INSTRUCTIONS
night 8 weeks after surgery. Home Physical Therapy is arranged. Home Health will contact you within 48 hrs of discharge that you have chosen. If you have not received a call within this time frame please contact that provider you chose. You should be given this information before you leave the hospital.     You are at a risk for falls. Use the rolling walker when walking. Patients who have had a joint replacement should not drive if they are still taking narcotic pain mediation during the daytime hours. Most patients wean themselves off of pain medication within 2-5 weeks after surgery. When to Call the office    - If you have a temperature greater then 101  - Uncontrolled vomiting   - Loose control of your bladder or bowel function  - Are unable to bear any wieght   - Need a pain medication refill          Total Knee Replacement: What to Expect at [address] [place] [address] had a total knee replacement. The doctor replaced the worn ends of the bones that connect to your knee (thighbone and lower leg bone) with plastic and metal parts. When you leave the hospital, you should be able to move around with a walker or crutches. But you will need someone to help you at home until you have more energy and can move around better. You will go home with a bandage and stitches, staples, skin glue, or tape strips. Change the bandage as your doctor tells you to. If you have stitches or staples, your doctor will remove them about 2 weeks after your surgery. Glue or tape strips will fall off on their own over time. You may still have some mild pain, and the area may be swollen for a few months after surgery. Your knee will continue to improve for up to a year. You will probably use a walker for some time after surgery. When you are ready, you can use a cane. You may be able to walk without support after a couple weeks, or when you are comfortable.   You will need to do months of physical rehabilitation (rehab)

## 2023-11-21 NOTE — ANESTHESIA PROCEDURE NOTES
Peripheral Block    Patient location during procedure: pre-op  Reason for block: post-op pain management and at surgeon's request  Start time: 11/21/2023 7:27 AM  End time: 11/21/2023 7:29 AM  Staffing  Performed: anesthesiologist   Anesthesiologist: Brendan Hernandez MD  Performed by: Brendan Hernandez MD  Authorized by: Brendan Hernandez MD    Preanesthetic Checklist  Completed: patient identified, IV checked, site marked, risks and benefits discussed, surgical/procedural consents, equipment checked, pre-op evaluation, timeout performed, anesthesia consent given, oxygen available and monitors applied/VS acknowledged  Peripheral Block   Patient position: supine  Prep: ChloraPrep  Provider prep: mask and sterile gloves  Patient monitoring: cardiac monitor, continuous pulse ox, frequent blood pressure checks, IV access, oxygen and responsive to questions  Block type: Femoral  Adductor canal  Laterality: left  Injection technique: single-shot  Guidance: ultrasound guided  Local infiltration: lidocaine  Infiltration strength: 1 %  Local infiltration: lidocaine  Dose: 3 mL    Needle   Needle type: insulated echogenic nerve stimulator needle   Needle gauge: 20 G  Needle localization: ultrasound guidance  Needle length: 10 cm  Assessment   Injection assessment: negative aspiration for heme, no paresthesia on injection, no intravascular symptoms and local visualized surrounding nerve on ultrasound  Paresthesia pain: none  Slow fractionated injection: yes  Hemodynamics: stable  Real-time US image taken/store: yes  Outcomes: patient tolerated procedure well and uncomplicated    Additional Notes  3 cc 1% lidocaine local at needle insertion site. Risks discussed including damage to muscle or nerve. Needle inserted and placed in close proximity to the nerve under real time ultrasound guidance. Ultrasound was used to visualize the spread of local anesthetic in close proximity to the nerve being blocked.   All structures appeared

## 2023-11-21 NOTE — DISCHARGE SUMMARY
[facility]  Total Joint Discharge Summary      Patient ID:  Juanita Woodson  [id number]  82 y.o.  1970    Admit date: 11/21/2023  Discharge date and time: 11/21/23   Admitting Physician: Leland Novak MD  Surgeon: Same  Admission Diagnoses: Osteoarthritis of left knee [M17.12]  Arthritis of left knee [M17.12]  Discharge Diagnoses: Principal Problem:    Osteoarthritis of left knee  Active Problems:    Arthritis of left knee  Resolved Problems:    * No resolved hospital problems. *                              Perioperative Antibiotics: Ancef 1 to 3 g was given depending on patient's weight. If allergic to Ancef or due to other indications, patient was given Vancomycin/Gent per protocol      Hospital Medications given:   [START ON 11/22/2023] celecoxib, 200 mg, Daily  [START ON 11/22/2023] lisinopril, 40 mg, Daily  sodium chloride flush, 5-40 mL, 2 times per day  acetaminophen, 650 mg, Q6H  ceFAZolin (ANCEF) IVPB, 3,000 mg, Q8H  sennosides-docusate sodium, 1 tablet, BID  aspirin, 81 mg, BID  [START ON 11/22/2023] dexamethasone, 10 mg, Once      sodium chloride  sodium chloride      albuterol, 2.5 mg, Q6H PRN  sodium chloride flush, 5-40 mL, PRN  sodium chloride, , PRN  oxyCODONE, 5 mg, Q4H PRN   Or  oxyCODONE, 10 mg, Q4H PRN  HYDROmorphone, 0.5 mg, Q3H PRN   Or  HYDROmorphone, 1 mg, Q3H PRN  promethazine, 25 mg, Q6H PRN   Or  ondansetron, 4 mg, Q6H PRN  aluminum & magnesium hydroxide-simethicone, 15 mL, Q6H PRN  diphenhydrAMINE, 25 mg, Q6H PRN   Or  diphenhydrAMINE, 25 mg, Q6H PRN  methocarbamol, 750 mg, 4x Daily PRN  naloxone, 0.4 mg, PRN        Discharge Medications given:  Current Discharge Medication List        CONTINUE these medications which have NOT CHANGED    Details   oxyCODONE (ROXICODONE) 5 MG immediate release tablet Take 1-2 tablets by mouth every 4 hours as needed for Pain for up to 7 days.  Max Daily Amount: 60 mg  Qty: 60 tablet, Refills: 0    Comments: Reduce doses

## 2023-11-21 NOTE — PERIOP NOTE
Betadine lavage:  17.5cc of betadine lot #[id number], exp. 2025-09-30,  in 500cc of . 9NS Lot #[id number], exp. 2026-09:  LEFT KNEE

## 2023-11-21 NOTE — INTERVAL H&P NOTE
Update History & Physical    The patient's History and Physical of November 17, 23 was reviewed with the patient and I examined the patient. There was no change. The surgical site was confirmed by the patient and me. Plan: The risks, benefits, expected outcome, and alternative to the recommended procedure have been discussed with the patient. Patient understands and wants to proceed with the procedure.      Electronically signed by Zula Bernheim, PA on 11/21/2023 at 6:48 AM

## 2023-11-21 NOTE — RT PROTOCOL NOTE
Respiratory Care Services     Policy Number: [id number]    Title: Oxygen Protocol    Effective Date: 01/1996    Revised Date: 06/2013, 02/29/2016, 4/2018, 7/2019    Reviewed Date: 05/2014, 03/2015, 06/2017, 11/2020        I. Policy: The Oxygen Protocol will be initiated for all patients upon written order from physician for administration of oxygen therapy or if a patient is found to have an oxygen saturation of 88% or less. Special consideration: the goal of oxygen therapy for COPD patients is to maintain oxygen saturation between 88% - 92% to comply with GOLD Guidelines. II. Purpose: To provide protocol driven respiratory therapy for the administration of oxygen at concentrations greater than that in ambient air with the intent of treating or preventing the symptoms and manifestations of hypoxia. III. Responsibility: Director Respiratory Care Services, all Respiratory Care Practitioners     IV. Indications:   Implement this protocol for patients when physician orders oxygen to be administered or when patient is found to have an oxygen saturation of 88% or less. To assure routine monitoring of patient's oxygen saturation b.i.d. and to make appropriate adjustments in accordance with ordered oxygen saturation parameters. To assure continuity of respiratory care that meets [facility] Clinical Practice Guidelines and GOLD Guidelines. Hb < 8  Sickle Cell anemia crisis    V. Assessment:  Assess the following parameters to determine the need to adjust oxygen:  Measurement of patient's oxygen saturation via pulse oximetry. Observation of patient's color, respiratory effort, and responsiveness. Measurement of heart rate and respiratory rate. Complete a three-step ambulatory oxygen saturation when ordered. VI. Initiation:  Upon receipt of an order for oxygen, the RCP will:   Verify order in the patient's EMR, which should include the desired oxygen saturation to be maintained.   The patient shall be placed on

## 2023-11-21 NOTE — ANESTHESIA PROCEDURE NOTES
Spinal Block    Patient location during procedure: OR  End time: 11/21/2023 8:14 AM  Reason for block: primary anesthetic  Staffing  Performed: anesthesiologist   Anesthesiologist: Kevin Fong MD  Performed by: Kevin Fong MD  Authorized by: Kevin Fong MD    Spinal Block  Patient position: sitting  Prep: ChloraPrep  Patient monitoring: cardiac monitor, continuous pulse ox and frequent blood pressure checks  Approach: midline  Location: L3/L4  Provider prep: mask and sterile gloves  Local infiltration: lidocaine  Needle  Needle type: pencil-tip   Needle gauge: 25 G  Needle length: 3.5 in  Assessment  Events: SAB placement uncomplicated. No paresthesia. Swirl obtained: Yes  CSF: clear  Attempts: 1  Hemodynamics: stable  Additional Notes  3 cc 1% lidocaine local injected at needle insertion site. Risks discussed including damage to muscle or nerve.   Preanesthetic Checklist  Completed: patient identified, IV checked, risks and benefits discussed, equipment checked, pre-op evaluation, timeout performed, anesthesia consent given, oxygen available and monitors applied/VS acknowledged

## 2023-11-21 NOTE — CARE COORDINATION
Patient is a 52y.o. year old male admitted for Left TKA . Patient plans to return home on discharge. Order received to arrange home health. Patient without preference towards agency. Referral sent to [facility]. Patient denies any equipment needs as patient has a walker. Will follow until discharge. 11/21/23 1413   Service Assessment   Patient Orientation Alert and Oriented   Cognition Alert   Primary Caregiver Self   Services At/After Discharge   Transition of Care Consult (CM Consult) [facility] Discharge Home Health;PT   Mode of Transport at Discharge Self   Confirm Follow Up Transport Self   Condition of Participation: Discharge Planning   The Plan for Transition of Care is related to the following treatment goals: improve mobility   The Patient and/or Patient Representative was provided with a Choice of Provider? Patient   The Patient and/Or Patient Representative agree with the Discharge Plan? Yes   Freedom of Choice list was provided with basic dialogue that supports the patient's individualized plan of care/goals, treatment preferences, and shares the quality data associated with the providers?   Yes

## 2023-11-22 ENCOUNTER — HOME CARE VISIT (OUTPATIENT)
Dept: SCHEDULING | Facility: HOME HEALTH | Age: 53
End: 2023-11-22
Payer: COMMERCIAL

## 2023-11-22 ENCOUNTER — TELEPHONE (OUTPATIENT)
Dept: ORTHOPEDICS UNIT | Age: 53
End: 2023-11-22

## 2023-11-22 VITALS
OXYGEN SATURATION: 97 % | SYSTOLIC BLOOD PRESSURE: 140 MMHG | DIASTOLIC BLOOD PRESSURE: 80 MMHG | RESPIRATION RATE: 16 BRPM | TEMPERATURE: 98.5 F | HEART RATE: 96 BPM

## 2023-11-22 PROCEDURE — G0151 HHCP-SERV OF PT,EA 15 MIN: HCPCS

## 2023-11-22 ASSESSMENT — ENCOUNTER SYMPTOMS
PAIN LOCATION - PAIN QUALITY: SORE
HEMOPTYSIS: 0

## 2023-11-22 NOTE — TELEPHONE ENCOUNTER
"Cyrus Sanford is a 49 year old male    NURSING ASSESSMENT:  Description:  Pt found a tick embedded in his left groin this morning. Pt is not 100% positive but believes it had to have been there for 24-36 hours. Pt is not sure if he removed all of the tick, \"it's hard to see\". He is unsure if the tick was a deer tick or a young regular tick. Site has a red ring around it, but not swollen. DENIES flu like symptoms, fevers, chills, aches or pain.  Pt is wondering if he needs prophylactic treatment for Lymes?   Pharmacy- [facility]   Last exam/Treatment:  12/16/2019  Allergies:   Allergies   Allergen Reactions     No Known Allergies      NURSING PLAN: Nursing advice to patient will route to provider for review.  Encouraged washing area with soap and water. Antibacterial ointment as needed. Monitor for signs of infection.     RECOMMENDED DISPOSITION:  See in 24 hours - will route to provider.   Will comply with recommendation: Yes  If further questions/concerns or if symptoms do not improve, worsen or new symptoms develop, call your PCP or [facility] as soon as possible.      Guideline used: Tick Bite   Telephone Triage Protocols for Nurses, Fifth Edition, Santa Riley, RN, RN    " Patient called to report uncontrolled post op pain. Had TKA with SDD yesterday. Advised how to alternate oxycodone, robaxin and ES tylenol. Reviewed cool jet ice machine protocol.

## 2023-11-25 DIAGNOSIS — G89.18 POST-OP PAIN: Primary | ICD-10-CM

## 2023-11-25 RX ORDER — OXYCODONE HYDROCHLORIDE 5 MG/1
5 TABLET ORAL EVERY 4 HOURS PRN
Qty: 42 TABLET | Refills: 0 | Status: SHIPPED | OUTPATIENT
Start: 2023-11-25 | End: 2023-12-02

## 2023-11-27 ENCOUNTER — HOME CARE VISIT (OUTPATIENT)
Dept: HOME HEALTH SERVICES | Facility: HOME HEALTH | Age: 53
End: 2023-11-27
Payer: COMMERCIAL

## 2023-11-27 PROCEDURE — G0157 HHC PT ASSISTANT EA 15: HCPCS

## 2023-11-28 ENCOUNTER — TELEPHONE (OUTPATIENT)
Dept: ORTHOPEDIC SURGERY | Age: 53
End: 2023-11-28

## 2023-11-28 DIAGNOSIS — M17.12 PRIMARY OSTEOARTHRITIS OF LEFT KNEE: Primary | ICD-10-CM

## 2023-11-28 RX ORDER — OXYCODONE HYDROCHLORIDE 5 MG/1
5-10 TABLET ORAL EVERY 4 HOURS PRN
Qty: 60 TABLET | Refills: 0 | Status: SHIPPED | OUTPATIENT
Start: 2023-11-28 | End: 2023-12-05

## 2023-11-28 NOTE — TELEPHONE ENCOUNTER
The pharmacist at [facility] is calling stating the patient has called repeatedly for two days. Him and his wife has called her cussing. She says she is refusing to fill this RX and if he calls again she will call [facility]. If you have any questions, please give her a call. He got #60 Oxycodone on Nov 17. He got #42 Oxycodone on Nov 25. She would not fill the new one until tomorrow but he is not having that. She will not fill at all now.

## 2023-11-28 NOTE — CASE COMMUNICATION
Pt received new RX for Oxycodone. The new RX has him taking 1 every four hours. His last RX was 1-2 every four hours. Pt states he needs two every four hours and has been taking the new RX 2 every four hours. Please send new RX for Pt to take Oxycodone 1-2 every four hours and contact Pt, if needed.   Thank you

## 2023-11-29 ENCOUNTER — TELEPHONE (OUTPATIENT)
Dept: ORTHOPEDIC SURGERY | Age: 53
End: 2023-11-29

## 2023-11-29 ENCOUNTER — HOME CARE VISIT (OUTPATIENT)
Dept: SCHEDULING | Facility: HOME HEALTH | Age: 53
End: 2023-11-29
Payer: COMMERCIAL

## 2023-11-29 VITALS
HEART RATE: 98 BPM | RESPIRATION RATE: 17 BRPM | TEMPERATURE: 97.9 F | DIASTOLIC BLOOD PRESSURE: 82 MMHG | SYSTOLIC BLOOD PRESSURE: 142 MMHG | OXYGEN SATURATION: 99 %

## 2023-11-29 VITALS
DIASTOLIC BLOOD PRESSURE: 70 MMHG | OXYGEN SATURATION: 96 % | HEART RATE: 100 BPM | SYSTOLIC BLOOD PRESSURE: 180 MMHG | TEMPERATURE: 96.9 F | RESPIRATION RATE: 18 BRPM

## 2023-11-29 PROCEDURE — G0157 HHC PT ASSISTANT EA 15: HCPCS

## 2023-11-29 RX ORDER — OXYCODONE HYDROCHLORIDE 5 MG/1
5-10 TABLET ORAL EVERY 4 HOURS PRN
Qty: 60 TABLET | Refills: 0 | Status: SHIPPED | OUTPATIENT
Start: 2023-11-29 | End: 2023-12-06

## 2023-11-29 ASSESSMENT — ENCOUNTER SYMPTOMS
PAIN LOCATION - PAIN QUALITY: ACHING
PAIN LOCATION - PAIN QUALITY: ACHES, JABS

## 2023-11-30 NOTE — CASE COMMUNICATION
In am of 11-29-23, patient's BP was above parameters at 180/70 in LA seated, patient stated he had not taken his blood pressure meds as of yet this morning, had patient take his meds, and advised patient he need to drink more water to help avoid dehydration. ..patient verbalized understanding    Patient is using a SW that does not lock into place when he is using it, very unsteady, and it is too short for him. Urged patient to have a fa sophie member go purchase patient a RW that can be adjusted to the proper height and keep patient safe while ambulating    Patient is also very constipated, has not had a BM since before surgery, Urged patient to have his daughter get some magnesius citrate and take it immediately to help patient have a BM. ..patient verbalized complete understanding.     Ellen, PTA  [address]  [phone]

## 2023-12-01 ENCOUNTER — HOME CARE VISIT (OUTPATIENT)
Dept: SCHEDULING | Facility: HOME HEALTH | Age: 53
End: 2023-12-01
Payer: COMMERCIAL

## 2023-12-01 VITALS
TEMPERATURE: 96.8 F | HEART RATE: 84 BPM | SYSTOLIC BLOOD PRESSURE: 180 MMHG | DIASTOLIC BLOOD PRESSURE: 64 MMHG | OXYGEN SATURATION: 98 % | RESPIRATION RATE: 18 BRPM

## 2023-12-01 PROCEDURE — G0157 HHC PT ASSISTANT EA 15: HCPCS

## 2023-12-01 ASSESSMENT — ENCOUNTER SYMPTOMS: PAIN LOCATION - PAIN QUALITY: ACHES, JABS

## 2023-12-01 NOTE — CASE COMMUNICATION
In am of 12-1-23, patient's BP was above parameters at 180/64 in LA seated, patient stated he had not taken his blood pressure meds as of yet this morning, had patient take his meds, URGED patient to take his meds 1st in the morning like he is supposed to. ..patient verbalized understanding   Patient continues to use a SW that does not lock into place when he is using it, is very unsteady, and it is too short for him. Urged patient to ha ve a family member go purchase patient a RW that can be adjusted to the proper height and keep patient safe while ambulating  Patient did have a BM Wednesday afternoon.     Ellen, PTA  [address]  [phone]

## 2023-12-01 NOTE — CASE COMMUNICATION
new med:  Magnesium citrate, shake well before using, drink 8 oz of water with each dose, take 6.5 to 10 fluid oz, maximum 10 fluid oz in 24 hours to help with constipation

## 2023-12-05 ENCOUNTER — HOME CARE VISIT (OUTPATIENT)
Dept: SCHEDULING | Facility: HOME HEALTH | Age: 53
End: 2023-12-05
Payer: COMMERCIAL

## 2023-12-05 ENCOUNTER — TELEPHONE (OUTPATIENT)
Dept: FAMILY MEDICINE CLINIC | Facility: CLINIC | Age: 53
End: 2023-12-05

## 2023-12-05 VITALS
RESPIRATION RATE: 18 BRPM | HEART RATE: 84 BPM | SYSTOLIC BLOOD PRESSURE: 175 MMHG | TEMPERATURE: 97.5 F | DIASTOLIC BLOOD PRESSURE: 90 MMHG | OXYGEN SATURATION: 98 %

## 2023-12-05 PROCEDURE — G0157 HHC PT ASSISTANT EA 15: HCPCS

## 2023-12-05 ASSESSMENT — ENCOUNTER SYMPTOMS: PAIN LOCATION - PAIN QUALITY: ACHING

## 2023-12-05 NOTE — TELEPHONE ENCOUNTER
[address] [address]      Name of the Nurse Calling and Facility: Manisha Butler @  [address] [place] [address]       Best Contact Number to Reach the Nurse: [phone]          Reason For Call: Manisha Butler went to see him today and his blood pressure was 175/90   he had been walking around the house pain level was 3out of 10

## 2023-12-07 ENCOUNTER — HOME CARE VISIT (OUTPATIENT)
Dept: SCHEDULING | Facility: HOME HEALTH | Age: 53
End: 2023-12-07
Payer: COMMERCIAL

## 2023-12-07 VITALS
RESPIRATION RATE: 18 BRPM | TEMPERATURE: 97.1 F | DIASTOLIC BLOOD PRESSURE: 80 MMHG | OXYGEN SATURATION: 97 % | HEART RATE: 97 BPM | SYSTOLIC BLOOD PRESSURE: 140 MMHG

## 2023-12-07 PROCEDURE — G0157 HHC PT ASSISTANT EA 15: HCPCS

## 2023-12-07 ASSESSMENT — ENCOUNTER SYMPTOMS: PAIN LOCATION - PAIN QUALITY: ACHING

## 2023-12-08 DIAGNOSIS — M17.12 PRIMARY OSTEOARTHRITIS OF LEFT KNEE: Primary | ICD-10-CM

## 2023-12-08 RX ORDER — OXYCODONE HYDROCHLORIDE 5 MG/1
5 TABLET ORAL EVERY 6 HOURS PRN
Qty: 20 TABLET | Refills: 0 | Status: SHIPPED | OUTPATIENT
Start: 2023-12-08 | End: 2023-12-13

## 2023-12-08 RX ORDER — OXYCODONE HYDROCHLORIDE 5 MG/1
5 TABLET ORAL EVERY 6 HOURS PRN
Qty: 20 TABLET | Refills: 0 | Status: SHIPPED | OUTPATIENT
Start: 2023-12-08 | End: 2023-12-08

## 2023-12-11 ENCOUNTER — HOME CARE VISIT (OUTPATIENT)
Dept: SCHEDULING | Facility: HOME HEALTH | Age: 53
End: 2023-12-11
Payer: COMMERCIAL

## 2023-12-11 ENCOUNTER — HOME CARE VISIT (OUTPATIENT)
Dept: HOME HEALTH SERVICES | Facility: HOME HEALTH | Age: 53
End: 2023-12-11
Payer: COMMERCIAL

## 2023-12-11 VITALS
DIASTOLIC BLOOD PRESSURE: 62 MMHG | SYSTOLIC BLOOD PRESSURE: 140 MMHG | TEMPERATURE: 96.8 F | HEART RATE: 83 BPM | OXYGEN SATURATION: 97 % | RESPIRATION RATE: 19 BRPM

## 2023-12-11 DIAGNOSIS — M17.12 PRIMARY OSTEOARTHRITIS OF LEFT KNEE: Primary | ICD-10-CM

## 2023-12-11 PROCEDURE — G0157 HHC PT ASSISTANT EA 15: HCPCS

## 2023-12-11 RX ORDER — OXYCODONE HYDROCHLORIDE 5 MG/1
5-10 TABLET ORAL EVERY 4 HOURS PRN
Qty: 60 TABLET | Refills: 0 | Status: SHIPPED | OUTPATIENT
Start: 2023-12-11 | End: 2023-12-18

## 2023-12-11 RX ORDER — METHOCARBAMOL 750 MG/1
TABLET, FILM COATED ORAL
Qty: 40 TABLET | Refills: 0 | Status: SHIPPED | OUTPATIENT
Start: 2023-12-11

## 2023-12-11 ASSESSMENT — ENCOUNTER SYMPTOMS: PAIN LOCATION - PAIN QUALITY: ACHES, JABS

## 2023-12-11 NOTE — CASE COMMUNICATION
Patient would like to go to [facility] in [facility] for outpatient therapy, we are discharging on 12-14-23, so he may start at any time after that. Thanks for your help.     Ellen, PTA  [address]  [phone]

## 2023-12-11 NOTE — TELEPHONE ENCOUNTER
He is requesting a refill on Robaxin and Oxycodone to [facility] in [place] on [address]. He says the weekend doctor had to call some in because his request wasn't filled on Friday. He says they changed it to one every six hours and he don't want it called in like that. He wants to talk to [facility] about his meds and is asking for a return call.

## 2023-12-14 ENCOUNTER — HOME CARE VISIT (OUTPATIENT)
Dept: SCHEDULING | Facility: HOME HEALTH | Age: 53
End: 2023-12-14
Payer: COMMERCIAL

## 2023-12-14 VITALS
RESPIRATION RATE: 18 BRPM | OXYGEN SATURATION: 98 % | TEMPERATURE: 97 F | HEART RATE: 83 BPM | SYSTOLIC BLOOD PRESSURE: 138 MMHG | DIASTOLIC BLOOD PRESSURE: 78 MMHG

## 2023-12-14 PROCEDURE — G0151 HHCP-SERV OF PT,EA 15 MIN: HCPCS

## 2023-12-15 ENCOUNTER — TELEPHONE (OUTPATIENT)
Dept: ORTHOPEDIC SURGERY | Age: 53
End: 2023-12-15

## 2023-12-15 DIAGNOSIS — M17.12 PRIMARY OSTEOARTHRITIS OF LEFT KNEE: Primary | ICD-10-CM

## 2023-12-20 DIAGNOSIS — Z96.652 HISTORY OF TOTAL KNEE ARTHROPLASTY, LEFT: Primary | ICD-10-CM

## 2023-12-20 RX ORDER — OXYCODONE HYDROCHLORIDE 5 MG/1
5-10 TABLET ORAL EVERY 4 HOURS PRN
Qty: 60 TABLET | Refills: 0 | Status: SHIPPED | OUTPATIENT
Start: 2023-12-20 | End: 2023-12-27

## 2024-01-02 DIAGNOSIS — Z96.652 TOTAL KNEE REPLACEMENT STATUS, LEFT: Primary | ICD-10-CM

## 2024-01-02 RX ORDER — OXYCODONE HYDROCHLORIDE 5 MG/1
5 TABLET ORAL
Qty: 30 TABLET | Refills: 0 | Status: SHIPPED | OUTPATIENT
Start: 2024-01-02 | End: 2024-01-07

## 2024-01-08 DIAGNOSIS — Z96.652 TOTAL KNEE REPLACEMENT STATUS, LEFT: Primary | ICD-10-CM

## 2024-01-08 RX ORDER — OXYCODONE HYDROCHLORIDE 5 MG/1
5 TABLET ORAL
Qty: 30 TABLET | Refills: 0 | Status: SHIPPED | OUTPATIENT
Start: 2024-01-08 | End: 2024-01-13

## 2024-01-08 NOTE — TELEPHONE ENCOUNTER
He is requesting a refill on Oxycodone to [facility] on [facility] in [place].   He would like to switch his PT to [facility] in [place]. He had been going to [facility]. [facility] to [facility] because they are one on one. Can you send an order to them?

## 2024-01-09 ENCOUNTER — CLINICAL DOCUMENTATION (OUTPATIENT)
Dept: ORTHOPEDIC SURGERY | Age: 54
End: 2024-01-09

## 2024-01-15 ENCOUNTER — TELEPHONE (OUTPATIENT)
Dept: ORTHOPEDIC SURGERY | Age: 54
End: 2024-01-15

## 2024-01-15 NOTE — TELEPHONE ENCOUNTER
Spoke with patient and let him know I can't give him anymore pain meds due to him being outside of his post op period. Patient voiced understanding.

## 2024-03-06 ENCOUNTER — HOSPITAL ENCOUNTER (OUTPATIENT)
Dept: SLEEP CENTER | Age: 54
Discharge: HOME | End: 2024-03-09

## 2024-03-21 DIAGNOSIS — G47.33 OSA (OBSTRUCTIVE SLEEP APNEA): Primary | ICD-10-CM

## 2024-03-21 NOTE — PROGRESS NOTES
Patient had recent sleep study that showed sleep apnea and hypoxemia. AHI-55.1      Lowest SaO2- 66%. Per Interp patient will need a cpap titration. Sleep lab will call the patient to schedule titration or the patient can call the sleep lab at [phone] to schedule.

## 2024-04-05 ENCOUNTER — NURSE ONLY (OUTPATIENT)
Dept: FAMILY MEDICINE CLINIC | Facility: CLINIC | Age: 54
End: 2024-04-05

## 2024-04-05 DIAGNOSIS — R79.89 LOW TESTOSTERONE: ICD-10-CM

## 2024-04-05 DIAGNOSIS — R73.09 OTHER ABNORMAL GLUCOSE: ICD-10-CM

## 2024-04-05 DIAGNOSIS — R63.5 WEIGHT GAIN: ICD-10-CM

## 2024-04-05 DIAGNOSIS — I10 ESSENTIAL (PRIMARY) HYPERTENSION: ICD-10-CM

## 2024-04-05 LAB
BASOPHILS # BLD: 0.1 K/UL (ref 0–0.2)
BASOPHILS NFR BLD: 1 % (ref 0–2)
DIFFERENTIAL METHOD BLD: ABNORMAL
EOSINOPHIL # BLD: 0.2 K/UL (ref 0–0.8)
EOSINOPHIL NFR BLD: 3 % (ref 0.5–7.8)
ERYTHROCYTE [DISTWIDTH] IN BLOOD BY AUTOMATED COUNT: 15.6 % (ref 11.9–14.6)
HCT VFR BLD AUTO: 46.9 % (ref 41.1–50.3)
HGB BLD-MCNC: 14.7 G/DL (ref 13.6–17.2)
IMM GRANULOCYTES # BLD AUTO: 0 K/UL (ref 0–0.5)
IMM GRANULOCYTES NFR BLD AUTO: 0 % (ref 0–5)
LYMPHOCYTES # BLD: 1.4 K/UL (ref 0.5–4.6)
LYMPHOCYTES NFR BLD: 23 % (ref 13–44)
MCH RBC QN AUTO: 25.4 PG (ref 26.1–32.9)
MCHC RBC AUTO-ENTMCNC: 31.3 G/DL (ref 31.4–35)
MCV RBC AUTO: 81 FL (ref 82–102)
MONOCYTES # BLD: 0.5 K/UL (ref 0.1–1.3)
MONOCYTES NFR BLD: 9 % (ref 4–12)
NEUTS SEG # BLD: 3.9 K/UL (ref 1.7–8.2)
NEUTS SEG NFR BLD: 64 % (ref 43–78)
NRBC # BLD: 0 K/UL (ref 0–0.2)
PLATELET # BLD AUTO: 237 K/UL (ref 150–450)
PMV BLD AUTO: 10.4 FL (ref 9.4–12.3)
RBC # BLD AUTO: 5.79 M/UL (ref 4.23–5.6)
WBC # BLD AUTO: 6.1 K/UL (ref 4.3–11.1)

## 2024-04-06 LAB
ALBUMIN SERPL-MCNC: 3.8 G/DL (ref 3.5–5)
ALBUMIN/GLOB SERPL: 1 (ref 0.4–1.6)
ALP SERPL-CCNC: 71 U/L (ref 50–136)
ALT SERPL-CCNC: 21 U/L (ref 12–65)
ANION GAP SERPL CALC-SCNC: 4 MMOL/L (ref 2–11)
AST SERPL-CCNC: 7 U/L (ref 15–37)
BILIRUB SERPL-MCNC: 0.5 MG/DL (ref 0.2–1.1)
BUN SERPL-MCNC: 19 MG/DL (ref 6–23)
CALCIUM SERPL-MCNC: 9 MG/DL (ref 8.3–10.4)
CHLORIDE SERPL-SCNC: 107 MMOL/L (ref 103–113)
CHOLEST SERPL-MCNC: 178 MG/DL
CO2 SERPL-SCNC: 28 MMOL/L (ref 21–32)
CREAT SERPL-MCNC: 1 MG/DL (ref 0.8–1.5)
EST. AVERAGE GLUCOSE BLD GHB EST-MCNC: 117 MG/DL
GLOBULIN SER CALC-MCNC: 3.8 G/DL (ref 2.8–4.5)
GLUCOSE SERPL-MCNC: 113 MG/DL (ref 65–100)
HBA1C MFR BLD: 5.7 % (ref 4.8–5.6)
HDLC SERPL-MCNC: 37 MG/DL (ref 40–60)
HDLC SERPL: 4.8
INSULIN SERPL-ACNC: 22.3 UIU/ML (ref 2.6–24.9)
LDLC SERPL CALC-MCNC: 124 MG/DL
POTASSIUM SERPL-SCNC: 4.3 MMOL/L (ref 3.5–5.1)
PROT SERPL-MCNC: 7.6 G/DL (ref 6.3–8.2)
SODIUM SERPL-SCNC: 139 MMOL/L (ref 136–146)
TESTOST SERPL-MCNC: 369 NG/DL (ref 264–916)
TRIGL SERPL-MCNC: 85 MG/DL (ref 35–150)
TSH, 3RD GENERATION: 7.19 UIU/ML (ref 0.36–3.74)
VLDLC SERPL CALC-MCNC: 17 MG/DL (ref 6–23)

## 2024-04-08 ASSESSMENT — PATIENT HEALTH QUESTIONNAIRE - PHQ9
2. FEELING DOWN, DEPRESSED OR HOPELESS: NOT AT ALL
SUM OF ALL RESPONSES TO PHQ QUESTIONS 1-9: 0
1. LITTLE INTEREST OR PLEASURE IN DOING THINGS: NOT AT ALL
SUM OF ALL RESPONSES TO PHQ9 QUESTIONS 1 & 2: 0
SUM OF ALL RESPONSES TO PHQ9 QUESTIONS 1 & 2: 0
SUM OF ALL RESPONSES TO PHQ QUESTIONS 1-9: 0
2. FEELING DOWN, DEPRESSED OR HOPELESS: NOT AT ALL
SUM OF ALL RESPONSES TO PHQ QUESTIONS 1-9: 0
1. LITTLE INTEREST OR PLEASURE IN DOING THINGS: NOT AT ALL
SUM OF ALL RESPONSES TO PHQ QUESTIONS 1-9: 0

## 2024-04-10 LAB — TESTOST FREE SERPL-MCNC: 6.1 PG/ML (ref 7.2–24)

## 2024-04-11 ENCOUNTER — OFFICE VISIT (OUTPATIENT)
Dept: FAMILY MEDICINE CLINIC | Facility: CLINIC | Age: 54
End: 2024-04-11
Payer: COMMERCIAL

## 2024-04-11 ENCOUNTER — TELEPHONE (OUTPATIENT)
Dept: FAMILY MEDICINE CLINIC | Facility: CLINIC | Age: 54
End: 2024-04-11

## 2024-04-11 VITALS
BODY MASS INDEX: 45.1 KG/M2 | OXYGEN SATURATION: 98 % | WEIGHT: 315 LBS | DIASTOLIC BLOOD PRESSURE: 108 MMHG | HEIGHT: 70 IN | SYSTOLIC BLOOD PRESSURE: 220 MMHG | HEART RATE: 91 BPM

## 2024-04-11 DIAGNOSIS — R79.89 ABNORMAL TSH: ICD-10-CM

## 2024-04-11 DIAGNOSIS — R73.09 ELEVATED HEMOGLOBIN A1C: ICD-10-CM

## 2024-04-11 DIAGNOSIS — I10 ESSENTIAL (PRIMARY) HYPERTENSION: ICD-10-CM

## 2024-04-11 DIAGNOSIS — Z96.652 HISTORY OF TOTAL KNEE ARTHROPLASTY, LEFT: ICD-10-CM

## 2024-04-11 DIAGNOSIS — R63.5 WEIGHT GAIN: ICD-10-CM

## 2024-04-11 DIAGNOSIS — R79.89 LOW TESTOSTERONE: ICD-10-CM

## 2024-04-11 DIAGNOSIS — M17.12 PRIMARY OSTEOARTHRITIS OF LEFT KNEE: Primary | ICD-10-CM

## 2024-04-11 PROCEDURE — 3017F COLORECTAL CA SCREEN DOC REV: CPT | Performed by: FAMILY MEDICINE

## 2024-04-11 PROCEDURE — 3080F DIAST BP >= 90 MM HG: CPT | Performed by: FAMILY MEDICINE

## 2024-04-11 PROCEDURE — 3077F SYST BP >= 140 MM HG: CPT | Performed by: FAMILY MEDICINE

## 2024-04-11 PROCEDURE — 4004F PT TOBACCO SCREEN RCVD TLK: CPT | Performed by: FAMILY MEDICINE

## 2024-04-11 PROCEDURE — 99214 OFFICE O/P EST MOD 30 MIN: CPT | Performed by: FAMILY MEDICINE

## 2024-04-11 PROCEDURE — G8417 CALC BMI ABV UP PARAM F/U: HCPCS | Performed by: FAMILY MEDICINE

## 2024-04-11 PROCEDURE — G8427 DOCREV CUR MEDS BY ELIG CLIN: HCPCS | Performed by: FAMILY MEDICINE

## 2024-04-11 RX ORDER — LISINOPRIL 20 MG/1
20 TABLET ORAL DAILY
Qty: 90 TABLET | Refills: 3 | Status: SHIPPED | OUTPATIENT
Start: 2024-04-11

## 2024-04-11 NOTE — TELEPHONE ENCOUNTER
Let the patient know that the sleep medicine clinic will be in touch about scheduling a follow-up with them.  They may be able to just go ahead and start him on the auto CPAP machine and if necessary they can do an overnight oximetry study at home to make sure that the CPAP is correcting the low oxygen readings.  Hopefully they can do this as a way to work around doing an in-house sleep study.  They should be contacting him about scheduling the appointment to talk with the sleep specialist

## 2024-04-11 NOTE — PROGRESS NOTES
Received request via: Pharmacy    Was the patient seen in the last year in this department? Yes    Does the patient have an active prescription (recently filled or refills available) for medication(s) requested? No    Does the patient have correction Plus and need 100 day supply (blood pressure, diabetes and cholesterol meds only)? Patient does not have SCP   Topics    Alcohol use: Yes     Alcohol/week: 2.0 standard drinks of alcohol     Types: 2 Shots of liquor per week    Drug use: No     Review of Systems   Constitutional:  Positive for unexpected weight change.   Musculoskeletal:  Positive for arthralgias and gait problem.   Neurological:  Negative for dizziness and headaches.     Blood pressure (!) 220/108, pulse 91, height 1.778 m (5' 10\"), weight (!) 144.4 kg (318 lb 6.4 oz), SpO2 98 %.  Objective:   Physical Exam  Vitals reviewed.   Constitutional:       General: He is not in acute distress.     Appearance: Normal appearance.   Cardiovascular:      Rate and Rhythm: Normal rate and regular rhythm.      Heart sounds: No murmur heard.  Pulmonary:      Effort: Pulmonary effort is normal.      Breath sounds: Normal breath sounds.   Neurological:      General: No focal deficit present.      Mental Status: He is alert and oriented to person, place, and time.       Results for orders placed or performed in visit on 04/05/24   Testosterone Total Only, Male   Result Value Ref Range    Testosterone 369 264 - 916 ng/dL   Testosterone, Free   Result Value Ref Range    Testosterone, Free 6.1 (L) 7.2 - 24.0 pg/mL   Insulin, Serum   Result Value Ref Range    Insulin 22.3 2.6 - 24.9 uIU/mL   Hemoglobin A1C   Result Value Ref Range    Hemoglobin A1C 5.7 (H) 4.8 - 5.6 %    Estimated Avg Glucose 117 mg/dL   Lipid Panel   Result Value Ref Range    Cholesterol, Total 178 <200 MG/DL    Triglycerides 85 35 - 150 MG/DL    HDL 37 (L) 40 - 60 MG/DL    LDL Calculated 124 (H) <100 MG/DL    VLDL Cholesterol Calculated 17 6.0 - 23.0 MG/DL    Chol/HDL Ratio 4.8     Comprehensive Metabolic Panel   Result Value Ref Range    Sodium 139 136 - 146 mmol/L    Potassium 4.3 3.5 - 5.1 mmol/L    Chloride 107 103 - 113 mmol/L    CO2 28 21 - 32 mmol/L    Anion Gap 4 2 - 11 mmol/L    Glucose 113 (H) 65 - 100 mg/dL    BUN 19 6 - 23 MG/DL    Creatinine 1.00 0.8 - 1.5 MG/DL    Est, Glom Filt Rate 89 >60

## 2024-04-15 NOTE — PROGRESS NOTES
results to: [phone]    DME - DURABLE MEDICAL EQUIPMENT     [facility]  Phone: [address]  [place] [address]  Dept: [phone]      Patient Name: Miguel Ballard  : 1970  Gender: male  Address: [address] [place] [address]  Patient phone number: [phone] (home) [phone] (work)      Primary Insurance: Payor: [employer] / Plan: [employer] - OPTIONS / Product Type: *No Product type* /   Subscriber ID: [id number] - (Commercial)      AMB Supply Order  Order Details     DME Location: [facility]   Order Date: 2024   The primary encounter diagnosis was DUY (obstructive sleep apnea). Diagnoses of Nocturnal hypoxemia, Snoring, Non-restorative sleep, Morning headache, Persistent disorder of initiating or maintaining sleep, and Class 3 severe obesity due to excess calories with serious comorbidity and body mass index (BMI) of 45.0 to 49.9 in adult (HCC) were also pertinent to this visit.          (  X   )New Set-Up <<<Urgent>>>     CPAP machine   (     ) CPAP Unit  (  x   ) Auto CPAP Unit  (     ) BiLevel Unit  (     ) Auto BiLevel Unit  (     ) ASV   (     ) Bilevel ST    (     ) Oxygen Concentrator         Length of need: 12 months    Pressure: 7-17  cmH20  EPR: 2     Starting Ramp Pressure:  7 cm H20  Ramp Time: min      Patient had a diagnostic Apnea Hypopnea Index (AHI) of :  55.1    *SUPPLIES* Replace all as needed, or per coverage guidelines     Masks Type:    (  x   ) -Full Face Mask (1 per 3 mon)  ( x    ) -Full Mask (1 per month) Interface/Cushion      ( x    ) -Nasal Mask (1 per 3 mon)  ( x    ) - Nasal Mask (1 per month) Interface/Cushion  ( x    ) -Pillow (2 per mon)  ( x    ) -Lprhemurs (1 per 6 mon)      _________________________________________________________________          Other Supplies:    (  X   )-Kulouopz (1 per 6 mon)  ( X    )-Danprt Tubing (1

## 2024-04-16 ENCOUNTER — TELEPHONE (OUTPATIENT)
Dept: SLEEP MEDICINE | Age: 54
End: 2024-04-16

## 2024-04-16 ENCOUNTER — OFFICE VISIT (OUTPATIENT)
Dept: SLEEP MEDICINE | Age: 54
End: 2024-04-16
Payer: COMMERCIAL

## 2024-04-16 VITALS
HEIGHT: 70 IN | SYSTOLIC BLOOD PRESSURE: 150 MMHG | OXYGEN SATURATION: 97 % | HEART RATE: 94 BPM | DIASTOLIC BLOOD PRESSURE: 90 MMHG | BODY MASS INDEX: 45.1 KG/M2 | WEIGHT: 315 LBS

## 2024-04-16 DIAGNOSIS — G47.34 NOCTURNAL HYPOXEMIA: ICD-10-CM

## 2024-04-16 DIAGNOSIS — R06.83 SNORING: ICD-10-CM

## 2024-04-16 DIAGNOSIS — G47.8 NON-RESTORATIVE SLEEP: ICD-10-CM

## 2024-04-16 DIAGNOSIS — R51.9 MORNING HEADACHE: ICD-10-CM

## 2024-04-16 DIAGNOSIS — G47.00 PERSISTENT DISORDER OF INITIATING OR MAINTAINING SLEEP: ICD-10-CM

## 2024-04-16 DIAGNOSIS — G47.33 OSA (OBSTRUCTIVE SLEEP APNEA): Primary | ICD-10-CM

## 2024-04-16 DIAGNOSIS — E66.01 CLASS 3 SEVERE OBESITY DUE TO EXCESS CALORIES WITH SERIOUS COMORBIDITY AND BODY MASS INDEX (BMI) OF 45.0 TO 49.9 IN ADULT (HCC): ICD-10-CM

## 2024-04-16 PROBLEM — E66.813 CLASS 3 SEVERE OBESITY DUE TO EXCESS CALORIES WITH SERIOUS COMORBIDITY AND BODY MASS INDEX (BMI) OF 45.0 TO 49.9 IN ADULT: Status: ACTIVE | Noted: 2018-11-13

## 2024-04-16 PROCEDURE — G8427 DOCREV CUR MEDS BY ELIG CLIN: HCPCS | Performed by: NURSE PRACTITIONER

## 2024-04-16 PROCEDURE — 99203 OFFICE O/P NEW LOW 30 MIN: CPT | Performed by: NURSE PRACTITIONER

## 2024-04-16 PROCEDURE — 3017F COLORECTAL CA SCREEN DOC REV: CPT | Performed by: NURSE PRACTITIONER

## 2024-04-16 PROCEDURE — 4004F PT TOBACCO SCREEN RCVD TLK: CPT | Performed by: NURSE PRACTITIONER

## 2024-04-16 PROCEDURE — G8417 CALC BMI ABV UP PARAM F/U: HCPCS | Performed by: NURSE PRACTITIONER

## 2024-04-16 ASSESSMENT — SLEEP AND FATIGUE QUESTIONNAIRES
HOW LIKELY ARE YOU TO NOD OFF OR FALL ASLEEP IN A CAR, WHILE STOPPED FOR A FEW MINUTES IN TRAFFIC: WOULD NEVER DOZE
HOW LIKELY ARE YOU TO NOD OFF OR FALL ASLEEP WHILE SITTING AND TALKING TO SOMEONE: WOULD NEVER DOZE
HOW LIKELY ARE YOU TO NOD OFF OR FALL ASLEEP WHEN YOU ARE A PASSENGER IN A CAR FOR AN HOUR WITHOUT A BREAK: WOULD NEVER DOZE
HOW LIKELY ARE YOU TO NOD OFF OR FALL ASLEEP WHILE LYING DOWN TO REST IN THE AFTERNOON WHEN CIRCUMSTANCES PERMIT: WOULD NEVER DOZE
HOW LIKELY ARE YOU TO NOD OFF OR FALL ASLEEP WHILE SITTING AND READING: WOULD NEVER DOZE
HOW LIKELY ARE YOU TO NOD OFF OR FALL ASLEEP WHILE WATCHING TV: WOULD NEVER DOZE
ESS TOTAL SCORE: 0
HOW LIKELY ARE YOU TO NOD OFF OR FALL ASLEEP WHILE SITTING INACTIVE IN A PUBLIC PLACE: WOULD NEVER DOZE

## 2024-04-16 NOTE — PATIENT INSTRUCTIONS
Start CPAP 7-17 cm H2O with nightly compliance  New CPAP set up and supplies ordered  Overnight oximetry on PAP therapy in 2 weeks after starting CPAP  Recommendations as above  Follow-up in 2 months or sooner if needed

## 2024-05-09 DIAGNOSIS — G47.34 NOCTURNAL HYPOXEMIA: ICD-10-CM

## 2024-05-10 ENCOUNTER — TELEPHONE (OUTPATIENT)
Dept: SLEEP MEDICINE | Age: 54
End: 2024-05-10

## 2024-05-22 ENCOUNTER — OFFICE VISIT (OUTPATIENT)
Dept: ORTHOPEDIC SURGERY | Age: 54
End: 2024-05-22
Payer: COMMERCIAL

## 2024-05-22 DIAGNOSIS — Z09 FOLLOW-UP EXAMINATION: ICD-10-CM

## 2024-05-22 DIAGNOSIS — Z96.652 TOTAL KNEE REPLACEMENT STATUS, LEFT: Primary | ICD-10-CM

## 2024-05-22 PROCEDURE — G8417 CALC BMI ABV UP PARAM F/U: HCPCS | Performed by: PHYSICIAN ASSISTANT

## 2024-05-22 PROCEDURE — G8428 CUR MEDS NOT DOCUMENT: HCPCS | Performed by: PHYSICIAN ASSISTANT

## 2024-05-22 PROCEDURE — 99213 OFFICE O/P EST LOW 20 MIN: CPT | Performed by: PHYSICIAN ASSISTANT

## 2024-05-22 PROCEDURE — 3017F COLORECTAL CA SCREEN DOC REV: CPT | Performed by: ORTHOPAEDIC SURGERY

## 2024-05-22 PROCEDURE — 4004F PT TOBACCO SCREEN RCVD TLK: CPT | Performed by: PHYSICIAN ASSISTANT

## 2024-05-22 NOTE — PROGRESS NOTES
Name: Miguel Ballard  YOB: 1970  Gender: male  MRN: [id number]    Post-Op LEFT TKA: 6 months    HPI: This patient returns now six months out from surgery.  The patient is happy with their return to function.  They are experiencing minimal discomfort.  They have weaned away from the cane.  Interval medical change is noted on the patient history form which is updated today.    Past Medical History:    Allergies:  No Known Allergies    Medications:  Current Outpatient Medications   Medication Sig    lisinopril (PRINIVIL;ZESTRIL) 20 MG tablet Take 1 tablet by mouth daily     No current facility-administered medications for this visit.       Past Medical history:  Past Medical History:   Diagnosis Date    Arthritis     osteo    Claustrophobia     Hypertension     off meds x 3 yrs--pt stopped on his own--per pt-- b/p stable off meds    Leg weakness, bilateral onset 2/2017    Morbid obesity (HCC)     bmi  50    Sleep apnea     Unspecified hypothyroidism         has a past surgical history that includes Knee arthroscopy; orthopedic surgery; lumbar discectomy (2001); thoracic laminectomy (2021); and Total knee arthroplasty (Left, 11/21/2023).     Family History:  [unfilled]     Social History:  [unfilled]    Review of Systems:       PE:  The patient is ambulating with reciprocal heel toe gait with no limp. Incision looks quite good with no evidence for infection.  Leg lengths appear to be approximately equal.      RADIOGRAPHS:  No radiographs are obtained today.    IMPRESSION AND PLAN:  The patient is doing well postop. I advised them to continue with their home exercise program, including strengthening and stretching exercises. They were advised on the use of Tylenol or NSAIDs if they have acute pain or swelling. Recommended the patient to FU in six months for a one year appointment with repeat radiographs of the operative side at that time. They will call if there are any issues in the

## 2024-06-05 ENCOUNTER — PATIENT MESSAGE (OUTPATIENT)
Dept: FAMILY MEDICINE CLINIC | Facility: CLINIC | Age: 54
End: 2024-06-05

## 2024-06-05 DIAGNOSIS — M48.04 THORACIC SPINAL STENOSIS: Primary | ICD-10-CM

## 2024-06-05 NOTE — TELEPHONE ENCOUNTER
I will be happy to refer the patient to physical therapy but could we please confirm what symptoms he is having and what physician diagnosed him with thoracic outlet syndrome as sometimes that is a vascular issue and he would need to see a vascular specialist as well

## 2024-06-05 NOTE — TELEPHONE ENCOUNTER
From: Miguel Ballard  To: Dr. Emery Mcdaniels  Sent: 6/5/2024 3:17 PM EDT  Subject: Referral to [facility]    [facility] thoracic outlet syndrome

## 2024-06-13 ENCOUNTER — OFFICE VISIT (OUTPATIENT)
Dept: SLEEP MEDICINE | Age: 54
End: 2024-06-13
Payer: COMMERCIAL

## 2024-06-13 VITALS
BODY MASS INDEX: 45.1 KG/M2 | DIASTOLIC BLOOD PRESSURE: 82 MMHG | HEART RATE: 100 BPM | SYSTOLIC BLOOD PRESSURE: 175 MMHG | RESPIRATION RATE: 14 BRPM | OXYGEN SATURATION: 97 % | HEIGHT: 70 IN | WEIGHT: 315 LBS

## 2024-06-13 DIAGNOSIS — G47.8 NON-RESTORATIVE SLEEP: ICD-10-CM

## 2024-06-13 DIAGNOSIS — G47.33 OSA (OBSTRUCTIVE SLEEP APNEA): Primary | ICD-10-CM

## 2024-06-13 DIAGNOSIS — E66.01 CLASS 3 SEVERE OBESITY DUE TO EXCESS CALORIES WITH SERIOUS COMORBIDITY AND BODY MASS INDEX (BMI) OF 45.0 TO 49.9 IN ADULT (HCC): ICD-10-CM

## 2024-06-13 DIAGNOSIS — G47.00 PERSISTENT DISORDER OF INITIATING OR MAINTAINING SLEEP: ICD-10-CM

## 2024-06-13 DIAGNOSIS — G47.34 NOCTURNAL HYPOXEMIA: ICD-10-CM

## 2024-06-13 DIAGNOSIS — R51.9 MORNING HEADACHE: ICD-10-CM

## 2024-06-13 PROCEDURE — 3017F COLORECTAL CA SCREEN DOC REV: CPT | Performed by: NURSE PRACTITIONER

## 2024-06-13 PROCEDURE — 99213 OFFICE O/P EST LOW 20 MIN: CPT | Performed by: NURSE PRACTITIONER

## 2024-06-13 PROCEDURE — 4004F PT TOBACCO SCREEN RCVD TLK: CPT | Performed by: NURSE PRACTITIONER

## 2024-06-13 PROCEDURE — G8427 DOCREV CUR MEDS BY ELIG CLIN: HCPCS | Performed by: NURSE PRACTITIONER

## 2024-06-13 PROCEDURE — G8417 CALC BMI ABV UP PARAM F/U: HCPCS | Performed by: NURSE PRACTITIONER

## 2024-06-13 ASSESSMENT — SLEEP AND FATIGUE QUESTIONNAIRES
HOW LIKELY ARE YOU TO NOD OFF OR FALL ASLEEP WHILE SITTING AND READING: WOULD NEVER DOZE
HOW LIKELY ARE YOU TO NOD OFF OR FALL ASLEEP WHILE LYING DOWN TO REST IN THE AFTERNOON WHEN CIRCUMSTANCES PERMIT: WOULD NEVER DOZE
HOW LIKELY ARE YOU TO NOD OFF OR FALL ASLEEP WHILE SITTING INACTIVE IN A PUBLIC PLACE: WOULD NEVER DOZE
HOW LIKELY ARE YOU TO NOD OFF OR FALL ASLEEP WHEN YOU ARE A PASSENGER IN A CAR FOR AN HOUR WITHOUT A BREAK: WOULD NEVER DOZE
HOW LIKELY ARE YOU TO NOD OFF OR FALL ASLEEP IN A CAR, WHILE STOPPED FOR A FEW MINUTES IN TRAFFIC: WOULD NEVER DOZE
HOW LIKELY ARE YOU TO NOD OFF OR FALL ASLEEP WHILE WATCHING TV: WOULD NEVER DOZE
HOW LIKELY ARE YOU TO NOD OFF OR FALL ASLEEP WHILE SITTING QUIETLY AFTER LUNCH WITHOUT ALCOHOL: WOULD NEVER DOZE
ESS TOTAL SCORE: 0
HOW LIKELY ARE YOU TO NOD OFF OR FALL ASLEEP WHILE SITTING AND TALKING TO SOMEONE: WOULD NEVER DOZE

## 2024-06-13 NOTE — PATIENT INSTRUCTIONS
Continue CPAP at new pressure settings of 10-17 cm H2O with nightly compliance  New CPAP supplies ordered  Recommendations as above  Follow-up in 6 months or sooner if needed

## 2024-06-13 NOTE — PROGRESS NOTES
EXAM:    Vitals:    06/13/24 1312   BP: (!) 175/82   Pulse: 100   Resp: 14   SpO2: 97%   Weight: (!) 148.3 kg (327 lb)   Height: 1.778 m (5' 10\")        Body mass index is 46.92 kg/m².         GENERAL APPEARANCE:   The patient is obese and in no respiratory distress.   HEENT:   PERRL.  Conjunctivae unremarkable.   Nasal mucosa is without epistaxis, exudate, or polyps. Nares patent bilateral.  Gums and dentition are unremarkable.  There is oropharyngeal narrowing.  Valdes score 4.      NECK/LYMPHATIC:   Symmetrical with no elevation of jugular venous pulsation.  Trachea midline. No thyroid enlargement.  No cervical adenopathy.   LUNGS:   Normal respiratory effort with symmetrical lung expansion.   Breath sounds clear.   HEART:   There is a regular rate and rhythm.  No murmur, rub, or gallop.  There is no edema in the lower extremities.   ABDOMEN:   Soft and non-tender.  No hepatosplenomegaly.  Bowel sounds are normal.     NEURO:   The patient is alert and oriented to person, place, and time.  Memory appears intact and mood is normal.  No gross sensorimotor deficits are present.          ASSESSMENT:  (Medical Decision Making)       ICD-10-CM    1. DUY (obstructive sleep apnea)  G47.33 DME - DURABLE MEDICAL EQUIPMENT - Patient is using, compliant and benefiting from Pap therapy. AHI is down to 5.3 events per hour.  I did adjust his CPAP pressures to 10-17 cm H2O.  Will also turn off his ramp as he does report that when he is first lying in bed he does not feel like he is getting enough air and has to open his mouth and breathe through his mouth.      2. Nocturnal hypoxemia  G47.34 Overnight oximetry on PAP therapy was normal      3. Non-restorative sleep  G47.8 Improved with sleep      4. Morning headache  R51.9 No longer having morning headaches      5. Persistent disorder of initiating or maintaining sleep  G47.00 Reports that he used to get up 3-4 times per night to use the bathroom but now he is sleeping solidly

## 2024-08-15 ENCOUNTER — LAB (OUTPATIENT)
Dept: FAMILY MEDICINE CLINIC | Facility: CLINIC | Age: 54
End: 2024-08-15

## 2024-08-15 DIAGNOSIS — I10 ESSENTIAL (PRIMARY) HYPERTENSION: ICD-10-CM

## 2024-08-15 DIAGNOSIS — R79.89 ABNORMAL TSH: ICD-10-CM

## 2024-08-15 DIAGNOSIS — R73.09 ELEVATED HEMOGLOBIN A1C: ICD-10-CM

## 2024-08-15 DIAGNOSIS — R79.89 LOW TESTOSTERONE: ICD-10-CM

## 2024-08-15 LAB
ALBUMIN SERPL-MCNC: 3.8 G/DL (ref 3.5–5)
ALBUMIN/GLOB SERPL: 1.3 (ref 1–1.9)
ALP SERPL-CCNC: 66 U/L (ref 40–129)
ALT SERPL-CCNC: 20 U/L (ref 12–65)
ANION GAP SERPL CALC-SCNC: 10 MMOL/L (ref 9–18)
AST SERPL-CCNC: 20 U/L (ref 15–37)
BASOPHILS # BLD: 0.1 K/UL (ref 0–0.2)
BASOPHILS NFR BLD: 1 % (ref 0–2)
BILIRUB SERPL-MCNC: 0.4 MG/DL (ref 0–1.2)
BUN SERPL-MCNC: 12 MG/DL (ref 6–23)
CALCIUM SERPL-MCNC: 9.3 MG/DL (ref 8.8–10.2)
CHLORIDE SERPL-SCNC: 103 MMOL/L (ref 98–107)
CHOLEST SERPL-MCNC: 167 MG/DL (ref 0–200)
CO2 SERPL-SCNC: 27 MMOL/L (ref 20–28)
CREAT SERPL-MCNC: 1.14 MG/DL (ref 0.8–1.3)
DIFFERENTIAL METHOD BLD: NORMAL
EOSINOPHIL # BLD: 0.2 K/UL (ref 0–0.8)
EOSINOPHIL NFR BLD: 3 % (ref 0.5–7.8)
ERYTHROCYTE [DISTWIDTH] IN BLOOD BY AUTOMATED COUNT: 13.2 % (ref 11.9–14.6)
EST. AVERAGE GLUCOSE BLD GHB EST-MCNC: 124 MG/DL
GLOBULIN SER CALC-MCNC: 2.9 G/DL (ref 2.3–3.5)
GLUCOSE SERPL-MCNC: 109 MG/DL (ref 70–99)
HBA1C MFR BLD: 5.9 % (ref 0–5.6)
HCT VFR BLD AUTO: 43.6 % (ref 41.1–50.3)
HDLC SERPL-MCNC: 32 MG/DL (ref 40–60)
HDLC SERPL: 5.3 (ref 0–5)
HGB BLD-MCNC: 13.8 G/DL (ref 13.6–17.2)
IMM GRANULOCYTES # BLD AUTO: 0 K/UL (ref 0–0.5)
IMM GRANULOCYTES NFR BLD AUTO: 0 % (ref 0–5)
LDLC SERPL CALC-MCNC: 115 MG/DL (ref 0–100)
LYMPHOCYTES # BLD: 1.3 K/UL (ref 0.5–4.6)
LYMPHOCYTES NFR BLD: 21 % (ref 13–44)
MCH RBC QN AUTO: 27.4 PG (ref 26.1–32.9)
MCHC RBC AUTO-ENTMCNC: 31.7 G/DL (ref 31.4–35)
MCV RBC AUTO: 86.7 FL (ref 82–102)
MONOCYTES # BLD: 0.6 K/UL (ref 0.1–1.3)
MONOCYTES NFR BLD: 9 % (ref 4–12)
NEUTS SEG # BLD: 4.2 K/UL (ref 1.7–8.2)
NEUTS SEG NFR BLD: 66 % (ref 43–78)
NRBC # BLD: 0 K/UL (ref 0–0.2)
PLATELET # BLD AUTO: 229 K/UL (ref 150–450)
PMV BLD AUTO: 11.1 FL (ref 9.4–12.3)
POTASSIUM SERPL-SCNC: 4.8 MMOL/L (ref 3.5–5.1)
PROT SERPL-MCNC: 6.7 G/DL (ref 6.3–8.2)
RBC # BLD AUTO: 5.03 M/UL (ref 4.23–5.6)
SODIUM SERPL-SCNC: 140 MMOL/L (ref 136–145)
T4 FREE SERPL-MCNC: 0.8 NG/DL (ref 0.9–1.7)
TRIGL SERPL-MCNC: 103 MG/DL (ref 0–150)
TSH, 3RD GENERATION: 6.44 UIU/ML (ref 0.27–4.2)
VLDLC SERPL CALC-MCNC: 21 MG/DL (ref 6–23)
WBC # BLD AUTO: 6.4 K/UL (ref 4.3–11.1)

## 2024-08-17 LAB
TESTOST FREE SERPL-MCNC: 7.6 PG/ML (ref 7.2–24)
TESTOST SERPL-MCNC: 455 NG/DL (ref 264–916)

## 2024-08-20 ENCOUNTER — TELEPHONE (OUTPATIENT)
Dept: FAMILY MEDICINE CLINIC | Facility: CLINIC | Age: 54
End: 2024-08-20

## 2024-08-20 DIAGNOSIS — R63.5 WEIGHT GAIN: ICD-10-CM

## 2024-08-20 DIAGNOSIS — R73.09 OTHER ABNORMAL GLUCOSE: ICD-10-CM

## 2024-08-20 DIAGNOSIS — R63.5 WEIGHT GAIN: Primary | ICD-10-CM

## 2024-08-22 ENCOUNTER — OFFICE VISIT (OUTPATIENT)
Dept: FAMILY MEDICINE CLINIC | Facility: CLINIC | Age: 54
End: 2024-08-22
Payer: COMMERCIAL

## 2024-08-22 VITALS
WEIGHT: 315 LBS | SYSTOLIC BLOOD PRESSURE: 158 MMHG | DIASTOLIC BLOOD PRESSURE: 96 MMHG | RESPIRATION RATE: 16 BRPM | TEMPERATURE: 98.2 F | OXYGEN SATURATION: 98 % | BODY MASS INDEX: 45.1 KG/M2 | HEIGHT: 70 IN | HEART RATE: 70 BPM

## 2024-08-22 DIAGNOSIS — D36.9 DERMOID CYST: ICD-10-CM

## 2024-08-22 DIAGNOSIS — R73.09 ELEVATED HEMOGLOBIN A1C: ICD-10-CM

## 2024-08-22 DIAGNOSIS — R79.89 LOW TESTOSTERONE: ICD-10-CM

## 2024-08-22 DIAGNOSIS — R73.09 OTHER ABNORMAL GLUCOSE: Primary | ICD-10-CM

## 2024-08-22 DIAGNOSIS — I10 ESSENTIAL (PRIMARY) HYPERTENSION: ICD-10-CM

## 2024-08-22 DIAGNOSIS — Z12.5 PROSTATE CANCER SCREENING: ICD-10-CM

## 2024-08-22 DIAGNOSIS — R79.89 ABNORMAL TSH: ICD-10-CM

## 2024-08-22 LAB — INSULIN SERPL-ACNC: 25.2 UIU/ML (ref 2.6–24.9)

## 2024-08-22 PROCEDURE — 3077F SYST BP >= 140 MM HG: CPT | Performed by: FAMILY MEDICINE

## 2024-08-22 PROCEDURE — 99213 OFFICE O/P EST LOW 20 MIN: CPT | Performed by: FAMILY MEDICINE

## 2024-08-22 PROCEDURE — 3080F DIAST BP >= 90 MM HG: CPT | Performed by: FAMILY MEDICINE

## 2024-08-22 PROCEDURE — G8417 CALC BMI ABV UP PARAM F/U: HCPCS | Performed by: FAMILY MEDICINE

## 2024-08-22 PROCEDURE — G8427 DOCREV CUR MEDS BY ELIG CLIN: HCPCS | Performed by: FAMILY MEDICINE

## 2024-08-22 PROCEDURE — 4004F PT TOBACCO SCREEN RCVD TLK: CPT | Performed by: FAMILY MEDICINE

## 2024-08-22 PROCEDURE — 3017F COLORECTAL CA SCREEN DOC REV: CPT | Performed by: FAMILY MEDICINE

## 2024-08-22 NOTE — PROGRESS NOTES
HISTORY OF PRESENT ILLNESS     Miguel Ballard is a 54 y.o. male who presents for       HPI  Patient comes in today for follow-up on his blood pressure.  He has been a little more active status post left knee replacement last November and feels like he is doing better with that and hopes to become more active and exercise more.  He is trying to get back on her stricter diet.  Recent labs that show his insulin level was higher and hemoglobin A1c was slightly increased at 5.9.  He really wants to try to get things under control through his diet and exercise first.  His TSH was increased and T4 was slightly low at 0.8.  The TSH has been increased in the past but it has come down compared to previous readings    Past Medical History:   Diagnosis Date    Arthritis     osteo    Claustrophobia     Hypertension     off meds x 3 yrs--pt stopped on his own--per pt-- b/p stable off meds    Leg weakness, bilateral onset 2/2017    Morbid obesity (HCC)     bmi  48    Sleep apnea     Unspecified hypothyroidism        No Known Allergies    Current Outpatient Medications   Medication Sig Dispense Refill    lisinopril (PRINIVIL;ZESTRIL) 20 MG tablet Take 1 tablet by mouth daily 90 tablet 3     No current facility-administered medications for this visit.       Social History     Tobacco Use    Smoking status: Some Days     Types: Cigars     Passive exposure: Past    Smokeless tobacco: Former     Quit date: 12/16/1994   Substance Use Topics    Alcohol use: Yes     Alcohol/week: 2.0 standard drinks of alcohol     Types: 2 Shots of liquor per week    Drug use: No       Review of Systems   Musculoskeletal:  Positive for arthralgias.       Blood pressure (!) 158/96, pulse 70, temperature 98.2 °F (36.8 °C), temperature source Temporal, resp. rate 16, height 1.778 m (5' 10\"), weight (!) 145.1 kg (319 lb 12.8 oz), SpO2 98%.    Physical Exam  Vitals reviewed.   Constitutional:       General: He is not in acute distress.     Appearance:

## 2024-08-29 ENCOUNTER — TELEPHONE (OUTPATIENT)
Dept: FAMILY MEDICINE CLINIC | Facility: CLINIC | Age: 54
End: 2024-08-29

## 2024-09-12 ENCOUNTER — TELEPHONE (OUTPATIENT)
Dept: FAMILY MEDICINE CLINIC | Facility: CLINIC | Age: 54
End: 2024-09-12

## 2024-12-17 ENCOUNTER — OFFICE VISIT (OUTPATIENT)
Dept: SLEEP MEDICINE | Age: 54
End: 2024-12-17
Payer: COMMERCIAL

## 2024-12-17 VITALS
BODY MASS INDEX: 45.1 KG/M2 | SYSTOLIC BLOOD PRESSURE: 154 MMHG | HEIGHT: 70 IN | WEIGHT: 315 LBS | HEART RATE: 99 BPM | RESPIRATION RATE: 14 BRPM | OXYGEN SATURATION: 97 % | DIASTOLIC BLOOD PRESSURE: 90 MMHG

## 2024-12-17 DIAGNOSIS — G47.33 OSA (OBSTRUCTIVE SLEEP APNEA): Primary | ICD-10-CM

## 2024-12-17 DIAGNOSIS — E66.01 CLASS 3 SEVERE OBESITY DUE TO EXCESS CALORIES WITH SERIOUS COMORBIDITY AND BODY MASS INDEX (BMI) OF 45.0 TO 49.9 IN ADULT: ICD-10-CM

## 2024-12-17 DIAGNOSIS — E66.813 CLASS 3 SEVERE OBESITY DUE TO EXCESS CALORIES WITH SERIOUS COMORBIDITY AND BODY MASS INDEX (BMI) OF 45.0 TO 49.9 IN ADULT: ICD-10-CM

## 2024-12-17 DIAGNOSIS — G47.34 NOCTURNAL HYPOXEMIA: ICD-10-CM

## 2024-12-17 PROCEDURE — G8417 CALC BMI ABV UP PARAM F/U: HCPCS | Performed by: NURSE PRACTITIONER

## 2024-12-17 PROCEDURE — 4004F PT TOBACCO SCREEN RCVD TLK: CPT | Performed by: NURSE PRACTITIONER

## 2024-12-17 PROCEDURE — G8484 FLU IMMUNIZE NO ADMIN: HCPCS | Performed by: NURSE PRACTITIONER

## 2024-12-17 PROCEDURE — 3017F COLORECTAL CA SCREEN DOC REV: CPT | Performed by: NURSE PRACTITIONER

## 2024-12-17 PROCEDURE — 99213 OFFICE O/P EST LOW 20 MIN: CPT | Performed by: NURSE PRACTITIONER

## 2024-12-17 PROCEDURE — G8427 DOCREV CUR MEDS BY ELIG CLIN: HCPCS | Performed by: NURSE PRACTITIONER

## 2024-12-17 PROCEDURE — G2211 COMPLEX E/M VISIT ADD ON: HCPCS | Performed by: NURSE PRACTITIONER

## 2024-12-17 ASSESSMENT — SLEEP AND FATIGUE QUESTIONNAIRES
HOW LIKELY ARE YOU TO NOD OFF OR FALL ASLEEP WHILE SITTING QUIETLY AFTER LUNCH WITHOUT ALCOHOL: WOULD NEVER DOZE
HOW LIKELY ARE YOU TO NOD OFF OR FALL ASLEEP IN A CAR, WHILE STOPPED FOR A FEW MINUTES IN TRAFFIC: WOULD NEVER DOZE
HOW LIKELY ARE YOU TO NOD OFF OR FALL ASLEEP WHILE LYING DOWN TO REST IN THE AFTERNOON WHEN CIRCUMSTANCES PERMIT: WOULD NEVER DOZE
HOW LIKELY ARE YOU TO NOD OFF OR FALL ASLEEP WHILE SITTING INACTIVE IN A PUBLIC PLACE: WOULD NEVER DOZE
ESS TOTAL SCORE: 0
HOW LIKELY ARE YOU TO NOD OFF OR FALL ASLEEP WHILE WATCHING TV: WOULD NEVER DOZE
HOW LIKELY ARE YOU TO NOD OFF OR FALL ASLEEP WHEN YOU ARE A PASSENGER IN A CAR FOR AN HOUR WITHOUT A BREAK: WOULD NEVER DOZE
HOW LIKELY ARE YOU TO NOD OFF OR FALL ASLEEP WHILE SITTING AND READING: WOULD NEVER DOZE
HOW LIKELY ARE YOU TO NOD OFF OR FALL ASLEEP WHILE SITTING AND TALKING TO SOMEONE: WOULD NEVER DOZE

## 2024-12-17 NOTE — PATIENT INSTRUCTIONS
Continue CPAP at new pressure settings of 13-18 cm H2O with nightly compliance  New CPAP supplies ordered  Recommendations as above  Follow-up in 6 months or sooner if needed

## 2024-12-19 ENCOUNTER — PATIENT MESSAGE (OUTPATIENT)
Dept: SLEEP MEDICINE | Age: 54
End: 2024-12-19

## 2025-02-23 ENCOUNTER — PATIENT MESSAGE (OUTPATIENT)
Dept: SLEEP MEDICINE | Age: 55
End: 2025-02-23

## 2025-05-08 DIAGNOSIS — I10 ESSENTIAL (PRIMARY) HYPERTENSION: ICD-10-CM

## 2025-05-08 RX ORDER — LISINOPRIL 20 MG/1
20 TABLET ORAL DAILY
Qty: 30 TABLET | Refills: 11 | Status: SHIPPED | OUTPATIENT
Start: 2025-05-08

## (undated) DEVICE — CLOSURE SKIN FLX NONINVASIVE PRELOC TECHNOLOGY FOR 24IN

## (undated) DEVICE — SUTURE ABSORBABLE MONOFILAMENT 1 CTX 36 CM 48 MM VIO PDS +

## (undated) DEVICE — STRAP RESTRAIN W3.5XL19IN TECLIN STRRP POS LEG DURING LITH

## (undated) DEVICE — CLOSURE SKIN FACILITATES COMPATIBILITY W/ CERTAIN IS DSG

## (undated) DEVICE — SUTURE ABS ANTIBACT 1-0 CTX 24IN STRATAFIX PDS+ SXPP1A445

## (undated) DEVICE — STERILE PRESSURE PROTECTOR PAD® FOR DE MAYO UNIVERSAL DISTRACTOR® (10/CASE): Brand: DE MAYO UNIVERSAL DISTRACTOR®

## (undated) DEVICE — DRAPE EQUIP SATELLITE STN STRL VELYS

## (undated) DEVICE — SUTURE VCRL SZ 1 L27IN ABSRB UD L36MM CP-1 1/2 CIR REV CUT J268H

## (undated) DEVICE — KIT DRP FOR RIO ROBOTIC ARM ASST SYS

## (undated) DEVICE — PIN BNE FIX TEMP L140MM DIA4MM MAKO

## (undated) DEVICE — SOLUTION IRRIG 1000ML STRL H2O USP PLAS POUR BTL

## (undated) DEVICE — SOLUTION IRRIG 3000ML 0.9% SOD CHL USP UROMATIC PLAS CONT

## (undated) DEVICE — DRESSING HYDROFIBER AQUACEL AG ADVANTAGE 3.5X14 IN

## (undated) DEVICE — SYRINGE MED 50ML LUERLOCK TIP

## (undated) DEVICE — Device

## (undated) DEVICE — PIN BNE FIX TEMP L110MM DIA4MM MAKO

## (undated) DEVICE — KIT TRK KNEE PROC VIZADISC

## (undated) DEVICE — DRESSING HYDROFIBER AQUACEL AG ADVANTAGE 3.5X10 IN

## (undated) DEVICE — SUTURE VCRL SZ 2-0 L27IN ABSRB UD L36MM CP-1 1/2 CIR REV J266H

## (undated) DEVICE — DRAPE EQUIP ROBOT STRL VELYS 20/PK ORDER IN MULTIIPLES OF 20 EACH

## (undated) DEVICE — BIPOLAR SEALER 23-112-1 AQM 6.0: Brand: AQUAMANTYS ®

## (undated) DEVICE — BLADE RMR L51MM PAT PILOT H

## (undated) DEVICE — DUAL CUT SAGITTAL BLADE

## (undated) DEVICE — TOTAL KNEE DR RIDGEWAY: Brand: MEDLINE INDUSTRIES, INC.

## (undated) DEVICE — SOLUTION IV 250ML 0.9% SOD CHL PH 5 INJ USP VIAFLX PLAS

## (undated) DEVICE — KIT INT FIX FEM TIB CKPT MAKOPLASTY

## (undated) DEVICE — SOLUTION IRRIG 1000ML 0.9% SOD CHL USP POUR PLAS BTL

## (undated) DEVICE — PIN DRL 4X125 MM ROBOTIC-ASSISTED SOLUTION ARRY VELYS

## (undated) DEVICE — ZIP 16 SURGICAL SKIN CLOSURE DEVICE: Brand: ZIP 16 SURGICAL SKIN CLOSURE DEVICE

## (undated) DEVICE — DRAPE,TOP,102X53,STERILE: Brand: MEDLINE

## (undated) DEVICE — STERILE PVP: Brand: MEDLINE INDUSTRIES, INC.

## (undated) DEVICE — YANKAUER,FLEXIBLE HANDLE,REGLR CAPACITY: Brand: MEDLINE INDUSTRIES, INC.